# Patient Record
Sex: FEMALE | Race: WHITE | Employment: UNEMPLOYED | URBAN - METROPOLITAN AREA
[De-identification: names, ages, dates, MRNs, and addresses within clinical notes are randomized per-mention and may not be internally consistent; named-entity substitution may affect disease eponyms.]

---

## 2017-04-04 ENCOUNTER — TELEPHONE (OUTPATIENT)
Dept: TRANSPLANT | Facility: CLINIC | Age: 22
End: 2017-04-04

## 2017-04-04 DIAGNOSIS — Z94.4 STATUS POST LIVER TRANSPLANTATION (H): ICD-10-CM

## 2017-04-04 RX ORDER — TACROLIMUS 1 MG/1
1 CAPSULE, GELATIN COATED ORAL 2 TIMES DAILY
Qty: 60 CAPSULE | Refills: 0 | Status: SHIPPED | OUTPATIENT
Start: 2017-04-04 | End: 2017-09-27

## 2017-07-28 DIAGNOSIS — Z94.4 STATUS POST LIVER TRANSPLANTATION (H): ICD-10-CM

## 2017-08-28 ENCOUNTER — TELEPHONE (OUTPATIENT)
Dept: TRANSPLANT | Facility: CLINIC | Age: 22
End: 2017-08-28

## 2017-08-28 NOTE — TELEPHONE ENCOUNTER
Call from [facility] at [address] in [place].  Tried reaching her for 2 weeks.  Not returning any of their calls.  Last filled Prograf on 6/26 for a 30 day supply.  They will no longer be trying to reach her.  If we reach her, we need to send to new scripts.

## 2017-08-28 NOTE — TELEPHONE ENCOUNTER
Received multiple requests for refills from [facility]. Informed [facility] that we would not refill prescriptions. Pt has not seen Dr. Mauro in clinic since 9/2014 and no lab results since 12/2015. Have attempted to reach patient multiple times including leaving a voicemail today at the number listed in the chart.  The voicemail says Choco so I am assuming that is mom. Asked her to call us regarding Jaclyn's prescriptions.

## 2017-09-14 ENCOUNTER — TELEPHONE (OUTPATIENT)
Dept: TRANSPLANT | Facility: CLINIC | Age: 22
End: 2017-09-14

## 2017-09-14 NOTE — TELEPHONE ENCOUNTER
Received a call from mom stating Jaclyn tried to go to [facility] for labs and they do not have orders. I faxed updated lab orders. Mom confirmed that Jaclyn has not run out of medications. She has not had labs or been seen in clinic since 2015. Mom said she is working with Jaclyn to get her appointments up to date and would like to schedule an appointment with Dr. Mauro. Phone call was transferred to Shari MACHUCA to schedule.

## 2017-09-14 NOTE — LETTER
OUTPATIENT LABORATORY TEST REQUEST  DIAGNOSES:  LIVER TRANSPLANT (ICD-10 Z94.4);  AND LONG TERM USE OF MEDICATIONS (ICD-10 Z79.899)    Patient Name: Jaclyn Diaz        YOB: 1995  MR #: [id number]  Issue Date & Time: September 14, 2017  10:16 AM   Expiration Date (1 year after date issued)    PLEASE FAX RESULTS [phone]    Monthly  [x]      CBC with Platelets and Differential  [x]      Basic Metabolic Panel (Sodium, Potassium, Chloride, CO2, Creatinine, Urea Nitrogen, Glucose, Calcium)  [x]      Hepatic Function (Albumin, AlkP, ALT, AST, Bili , Protein)  [x]      GGT  [x]      Magnesium  []      Phosphorus    Monthly Drug Levels (mail to [facility] mailers and instructions will be provided by the patient)  [x]      Tacrolimus drug level    Yearly  [x]      FASTING Lipids (Cholesterol, Triglycerides, HDL, LDL)    Please call Sara [phone] with questions.           Stephanie Mauro M.D.  AssociateProfessor   Department of Pediatrics  Division of Gastroenterology, Hepatology and Nutrition

## 2017-09-27 ENCOUNTER — OFFICE VISIT (OUTPATIENT)
Dept: GASTROENTEROLOGY | Facility: CLINIC | Age: 22
End: 2017-09-27
Attending: PEDIATRICS

## 2017-09-27 ENCOUNTER — DOCUMENTATION ONLY (OUTPATIENT)
Dept: CARE COORDINATION | Facility: CLINIC | Age: 22
End: 2017-09-27

## 2017-09-27 VITALS
TEMPERATURE: 97.7 F | WEIGHT: 175.93 LBS | BODY MASS INDEX: 26.66 KG/M2 | DIASTOLIC BLOOD PRESSURE: 81 MMHG | HEIGHT: 68 IN | SYSTOLIC BLOOD PRESSURE: 130 MMHG | HEART RATE: 74 BPM

## 2017-09-27 DIAGNOSIS — Z94.4 LIVER REPLACED BY TRANSPLANT (H): ICD-10-CM

## 2017-09-27 DIAGNOSIS — Z94.4 STATUS POST LIVER TRANSPLANTATION (H): Primary | ICD-10-CM

## 2017-09-27 LAB
ALBUMIN SERPL-MCNC: 3.9 G/DL (ref 3.4–5)
ALP SERPL-CCNC: 105 U/L (ref 40–150)
ALT SERPL W P-5'-P-CCNC: 21 U/L (ref 0–50)
ANION GAP SERPL CALCULATED.3IONS-SCNC: 11 MMOL/L (ref 3–14)
AST SERPL W P-5'-P-CCNC: 14 U/L (ref 0–45)
BASOPHILS # BLD AUTO: 0 10E9/L (ref 0–0.2)
BASOPHILS NFR BLD AUTO: 0.1 %
BILIRUB DIRECT SERPL-MCNC: 0.1 MG/DL (ref 0–0.2)
BILIRUB SERPL-MCNC: 0.6 MG/DL (ref 0.2–1.3)
BUN SERPL-MCNC: 7 MG/DL (ref 7–30)
CALCIUM SERPL-MCNC: 8.9 MG/DL (ref 8.5–10.1)
CHLORIDE SERPL-SCNC: 109 MMOL/L (ref 94–109)
CO2 SERPL-SCNC: 22 MMOL/L (ref 20–32)
CREAT SERPL-MCNC: 0.71 MG/DL (ref 0.52–1.04)
DIFFERENTIAL METHOD BLD: ABNORMAL
EOSINOPHIL # BLD AUTO: 0.1 10E9/L (ref 0–0.7)
EOSINOPHIL NFR BLD AUTO: 1.9 %
ERYTHROCYTE [DISTWIDTH] IN BLOOD BY AUTOMATED COUNT: 11.9 % (ref 10–15)
GFR SERPL CREATININE-BSD FRML MDRD: >90 ML/MIN/1.7M2
GGT SERPL-CCNC: 9 U/L (ref 0–40)
GLUCOSE SERPL-MCNC: 92 MG/DL (ref 70–99)
HCT VFR BLD AUTO: 44.1 % (ref 35–47)
HGB BLD-MCNC: 15.7 G/DL (ref 11.7–15.7)
IMM GRANULOCYTES # BLD: 0 10E9/L (ref 0–0.4)
IMM GRANULOCYTES NFR BLD: 0.3 %
LYMPHOCYTES # BLD AUTO: 1.8 10E9/L (ref 0.8–5.3)
LYMPHOCYTES NFR BLD AUTO: 23.6 %
MAGNESIUM SERPL-MCNC: 2 MG/DL (ref 1.6–2.3)
MCH RBC QN AUTO: 32.1 PG (ref 26.5–33)
MCHC RBC AUTO-ENTMCNC: 35.6 G/DL (ref 31.5–36.5)
MCV RBC AUTO: 90 FL (ref 78–100)
MONOCYTES # BLD AUTO: 0.4 10E9/L (ref 0–1.3)
MONOCYTES NFR BLD AUTO: 5.9 %
NEUTROPHILS # BLD AUTO: 5.1 10E9/L (ref 1.6–8.3)
NEUTROPHILS NFR BLD AUTO: 68.2 %
NRBC # BLD AUTO: 0 10*3/UL
NRBC BLD AUTO-RTO: 0 /100
PHOSPHATE SERPL-MCNC: 3 MG/DL (ref 2.5–4.5)
PLATELET # BLD AUTO: 140 10E9/L (ref 150–450)
POTASSIUM SERPL-SCNC: 4.3 MMOL/L (ref 3.4–5.3)
PROT SERPL-MCNC: 6.9 G/DL (ref 6.8–8.8)
RBC # BLD AUTO: 4.89 10E12/L (ref 3.8–5.2)
SODIUM SERPL-SCNC: 142 MMOL/L (ref 133–144)
WBC # BLD AUTO: 7.5 10E9/L (ref 4–11)

## 2017-09-27 PROCEDURE — 99212 OFFICE O/P EST SF 10 MIN: CPT | Mod: ZF

## 2017-09-27 PROCEDURE — 80048 BASIC METABOLIC PNL TOTAL CA: CPT | Performed by: PEDIATRICS

## 2017-09-27 PROCEDURE — G0008 ADMIN INFLUENZA VIRUS VAC: HCPCS

## 2017-09-27 PROCEDURE — 83735 ASSAY OF MAGNESIUM: CPT | Performed by: PEDIATRICS

## 2017-09-27 PROCEDURE — 36415 COLL VENOUS BLD VENIPUNCTURE: CPT | Performed by: PEDIATRICS

## 2017-09-27 PROCEDURE — 25000128 H RX IP 250 OP 636: Mod: ZF

## 2017-09-27 PROCEDURE — 85025 COMPLETE CBC W/AUTO DIFF WBC: CPT | Performed by: PEDIATRICS

## 2017-09-27 PROCEDURE — 82977 ASSAY OF GGT: CPT | Performed by: PEDIATRICS

## 2017-09-27 PROCEDURE — 90651 9VHPV VACCINE 2/3 DOSE IM: CPT | Mod: ZF

## 2017-09-27 PROCEDURE — 90471 IMMUNIZATION ADMIN: CPT | Mod: ZF

## 2017-09-27 PROCEDURE — 25000125 ZZHC RX 250: Mod: ZF

## 2017-09-27 PROCEDURE — 84100 ASSAY OF PHOSPHORUS: CPT | Performed by: PEDIATRICS

## 2017-09-27 PROCEDURE — 90472 IMMUNIZATION ADMIN EACH ADD: CPT

## 2017-09-27 PROCEDURE — 80076 HEPATIC FUNCTION PANEL: CPT | Performed by: PEDIATRICS

## 2017-09-27 PROCEDURE — 90686 IIV4 VACC NO PRSV 0.5 ML IM: CPT | Mod: ZF

## 2017-09-27 RX ORDER — URSODIOL 300 MG/1
300 CAPSULE ORAL EVERY 12 HOURS
Qty: 180 CAPSULE | Refills: 1 | Status: SHIPPED | OUTPATIENT
Start: 2017-09-27 | End: 2018-04-04

## 2017-09-27 RX ORDER — URSODIOL 300 MG/1
300 CAPSULE ORAL EVERY 12 HOURS
Qty: 90 CAPSULE | Refills: 1 | Status: SHIPPED | OUTPATIENT
Start: 2017-09-27 | End: 2017-09-27

## 2017-09-27 RX ORDER — URSODIOL 300 MG/1
300 CAPSULE ORAL EVERY 12 HOURS
Qty: 60 CAPSULE | Refills: 4 | Status: SHIPPED | OUTPATIENT
Start: 2017-09-27 | End: 2017-09-27

## 2017-09-27 RX ORDER — TACROLIMUS 1 MG/1
1 CAPSULE, GELATIN COATED ORAL 2 TIMES DAILY
Qty: 60 CAPSULE | Refills: 4 | Status: SHIPPED | OUTPATIENT
Start: 2017-09-27 | End: 2017-11-02

## 2017-09-27 ASSESSMENT — PAIN SCALES - GENERAL: PAINLEVEL: NO PAIN (0)

## 2017-09-27 NOTE — PATIENT INSTRUCTIONS
1. Labs today  2. HPV and Influenza today  3. Prescriptions sent to pharmacy  4. Follow-Up with PCP Dr. Clark  5. Tacro level within the next week    Call Sara with any questions or concerns. [phone]

## 2017-09-27 NOTE — MR AVS SNAPSHOT
After Visit Summary   9/27/2017    Jaclyn Diaz    MRN: [id number]           Patient Information     Date Of Birth          1995        Visit Information        Provider Department      9/27/2017 10:45 AM Stephanie Mauro MD Peds GI        Today's Diagnoses     Status post liver transplantation (H)    -  1    Liver replaced by transplant (H)          Care Instructions    1. Labs today  2. HPV and Influenza today  3. Prescriptions sent to pharmacy  4. Follow-Up with PCP Dr. Clark  5. Tacro level within the next week    Call Sara with any questions or concerns. [phone]          Follow-ups after your visit        Follow-up notes from your care team     Return in about 4 months (around 1/27/2018).      Future tests that were ordered for you today     Open Future Orders        Priority Expected Expires Ordered    CBC with platelets differential Routine 9/27/2017 9/27/2018 9/27/2017    GGT Routine 9/27/2017 9/27/2018 9/27/2017    Hepatic panel Routine 9/27/2017 9/27/2018 9/27/2017    Basic metabolic panel Routine 9/27/2017 9/27/2018 9/27/2017    Magnesium Routine 9/27/2017 9/27/2018 9/27/2017    Phosphorus Routine 9/27/2017 9/27/2018 9/27/2017            Who to contact     Please call your clinic at [phone] to:    Ask questions about your health    Make or cancel appointments    Discuss your medicines    Learn about your test results    Speak to your doctor   If you have compliments or concerns about an experience at your clinic, or if you wish to file a complaint, please contact [facility] Patient Relations at [phone] or email us at [email or web address]         Additional Information About Your Visit        MyChart Information     MyChart gives you secure access to your electronic health record. If you see a primary care provider, you can also send messages to your care team and make appointments. If you have questions, please  "call your primary care clinic.  If you do not have a primary care provider, please call [phone] and they will assist you.      [employer] is an electronic gateway that provides easy, online access to your medical records. With [employer], you can request a clinic appointment, read your test results, renew a prescription or communicate with your care team.     To access your existing account, please contact your [facility] or call [phone] for assistance.        Care EveryWhere ID     This is your Care EveryWhere ID. This could be used by other organizations to access your [facility] medical records  [id number]        Your Vitals Were     Pulse Temperature Height BMI (Body Mass Index)          74 97.7  F (36.5  C) (Oral) 5' 7.64\" (171.8 cm) 27.04 kg/m2         Blood Pressure from Last 3 Encounters:   09/27/17 130/81   12/10/15 111/73   07/06/15 115/78    Weight from Last 3 Encounters:   09/27/17 175 lb 14.8 oz (79.8 kg)   12/10/15 166 lb 7.2 oz (75.5 kg) (91 %)*   07/06/15 157 lb 3 oz (71.3 kg) (87 %)*     * Growth percentiles are based on CDC 2-20 Years data.              We Performed the Following     HC FLU VAC PRESRV FREE QUAD SPLIT VIR 3+YRS IM     HUMAN PAPILLOMAVIRUS VACCINE          Where to get your medicines      These medications were sent to [facility] - [place], [place] - [address]  [address] [facility] [facility] [address]     Phone:  [phone]     tacrolimus capsule         These medications were sent to [facility] [address] [facility] [address] [facility] [address]  [address] [facility] [address]     Phone:  [phone]     ursodiol 300 MG capsule          Primary Care Provider Office Phone # Fax #    Jessica Vanessa Clark [phone]       [facility] [address] [place] [address]        Equal " Access to Services     [facility] : Hadii aad ku hadamygena Reinaryan, waidaliada luqludivina, qamalik mirianmariocarlos gambino. So [facility] [phone].    ATENCIÓN: Si habla español, tiene a bates disposición servicios gratuitos de asistencia lingüística. Llame al [phone].    We comply with applicable federal civil rights laws and [place] laws. We do not discriminate on the basis of race, color, national origin, age, disability sex, sexual orientation or gender identity.            Thank you!     [facility]   for your care. Our goal is always to provide you with excellent care. Hearing back from our patients is one way we can continue to improve our services. Please take a few minutes to complete the written survey that you may receive in the mail after your visit with us. Thank you!             Your Updated Medication List - Protect others around you: Learn how to safely use, store and throw away your medicines at www.disposemymeds.org.          This list is accurate as of: 9/27/17 12:08 PM.  Always use your most recent med list.                   Brand Name Dispense Instructions for use Diagnosis    CITALOPRAM HYDROBROMIDE PO      Take 40 mg by mouth daily        tacrolimus capsule     60 capsule    Take 1 capsule (1 mg) by mouth 2 times daily    Status post liver transplantation (H)       ursodiol 300 MG capsule    ACTIGALL    60 capsule    Take 1 capsule (300 mg) by mouth every 12 hours    Liver replaced by transplant (H)

## 2017-09-27 NOTE — NURSING NOTE
The following medication was given:     MEDICATION:  Human Papillomavirus  ROUTE: IM  SITE: Deltoid - Right  DOSE: 0.5  LOT #: [id number]  : [employer], [employer]  EXPIRATION DATE: 2020Jan21  NDC#: 5013-6713-14   Was there drug waste? Zully Gordon LPN  September 27, 2017

## 2017-09-27 NOTE — NURSING NOTE
"Chief Complaint   Patient presents with     RECHECK     Tx       Initial /81  Pulse 74  Temp 97.7  F (36.5  C) (Oral)  Ht 5' 7.64\" (171.8 cm)  Wt 175 lb 14.8 oz (79.8 kg)  BMI 27.04 kg/m2 Estimated body mass index is 27.04 kg/(m^2) as calculated from the following:    Height as of this encounter: 5' 7.64\" (171.8 cm).    Weight as of this encounter: 175 lb 14.8 oz (79.8 kg).  Medication Reconciliation: complete Griselda Aleman LPN      "

## 2017-09-27 NOTE — LETTER
"  9/27/2017      RE: Jaclyn Diaz  [address] [facility] [address]       Jennifer Gonzales  [facility] [address] [facility] [address]     Dear Dr. Gonzales:    Your patient, Jaclyn Diaz, was seen in Pediatric Gastroenterology Clinic at the [facility] on 9/27/17.  aJclyn is an 21 year old female s/p liver transplantation who returns after a 2 year interval. Jaclyn was accompanied by patient's mother.  Since the last evaluation, Jaclyn has had no abdominal pain, constipation or diarrhea. She uses sunscreen fairly well.    She has not been checking her liver labs regularly and returned because she needed medication refills.  Her liver enzymes and tacrolimus levels are not available to me.    She is working part-time and has no insurance. [place]. Her father is disabled.    Current Outpatient Prescriptions   Medication Sig Dispense Refill     ursodiol (ACTIGALL) 300 MG capsule Take 1 capsule (300 mg) by mouth every 12 hours 60 capsule 4     PROGRAF (BRAND) 1 MG CAPSULE Take 1 capsule (1 mg) by mouth 2 times daily 60 capsule 4     CITALOPRAM HYDROBROMIDE PO Take 40 mg by mouth daily         Interim History:  Emergency Room visits: No  Hospitalizations: No  Surgeries: No  School status:not in school  Family Changes: none    ROS:  The Review of Systems is negative other than noted in the HPI. She is on birth control (Norplant) and uses condoms.    PE:  On physical examination, she is alert, active and in no acute distress.  /81  Pulse 74  Temp 97.7  F (36.5  C) (Oral)  Ht 5' 7.64\" (171.8 cm)  Wt 175 lb 14.8 oz (79.8 kg)  BMI 27.04 kg/m2  Normalized weight-for-age data not available for patients older than 20 years., Normalized stature-for-age data not available for patients older than 20 years.  Body mass index is 27.04 kg/(m^2). Normalized BMI data available only for age 0 to 20 years..  The pupils are equal, round and reactive " to light with full extraocular movements.  No enlarged tonsils. There is no lymphadenopathy.    The abdomen is soft and nontender with no masses or organomegaly.The scars are well healed.  The extremities reveal no clubbing or edema and have full range of motion.  The skin reveals no unusual rashes or lesions.       Assessment and Plan:    ICD-10-CM    1. Status post liver transplantation (H) Z94.4 HUMAN PAPILLOMAVIRUS VACCINE     HC FLU VAC PRESRV FREE QUAD SPLIT VIR 3+YRS IM     PROGRAF (BRAND) 1 MG CAPSULE     CBC with platelets differential     GGT     Hepatic panel     Basic metabolic panel     Magnesium     Phosphorus   2. Liver replaced by transplant (H) Z94.4 ursodiol (ACTIGALL) 300 MG capsule        Jaclyn needs every other month liver labs to monitor for rejection, which may occur late and cause loss of the transplant.     She understands she needs to see a therapist regarding anti-depressants. We will have MSDANIEL see her today to deal with some of her financial issues around medical care.     I counseled Jaclyn on her sunscreen use.     Return in about 4 months (around 1/27/2018).         Thank you for allowing me to participate in Jaclyn's care. If you have any questions, please contact the nurse line at [phone] (Suha Yoder RN and Michelle Conway RN).  If you have scheduling needs, please call the Call Center at [phone].  If you are waiting on stool tests or outside results and do not hear from us after two weeks of testing, please contact us.  Outside results should be faxed to [phone].    Sincerely    Stephanie Mauro MD   of Pediatrics  Director, Pediatric Gastroenterology, Hepatology and Nutrition  [facility]    CC  Patient Care Team:  Jessica Gomez as PCP - General (Family Practice)  ORESTES NUNEZ    Copy to patient  Jaclyn Diaz  [address] [facility] [address]

## 2017-09-27 NOTE — LETTER
PHYSICIAN ORDERS      DATE & TIME ISSUED: 2017  3:30 PM  PATIENT NAME: Jaclyn Diaz   : 1995     [facility] MR# [if applicable]: [id number]     DIAGNOSIS/ICD-10 CODE: Liver transplanted [Z94.4}  Week of 10/2/17:  Tacrolimus level    If questions please call: Sara [phone]    Please fax these results to [phone].           Stephanie Mauro M.D.  AssociateProfessor   Department of Pediatrics  Division of Gastroenterology, Hepatology and Nutrition

## 2017-09-27 NOTE — PROGRESS NOTES
"Jennifer Gonzales  [facility] [address] [address]     Dear Dr. Gonzales:    Your patient, Jaclyn Diaz, was seen in Pediatric Gastroenterology Clinic at the [facility] on 9/27/17.  Jaclyn is an 21 year old female s/p liver transplantation who returns after a 2 year interval. Jaclyn was accompanied by patient's mother.  Since the last evaluation, Jaclyn has had no abdominal pain, constipation or diarrhea. She uses sunscreen fairly well.    She has not been checking her liver labs regularly and returned because she needed medication refills.  Her liver enzymes and tacrolimus levels are not available to me.    She is working part-time and has no insurance. [place] her. Her father is disabled.    Current Outpatient Prescriptions   Medication Sig Dispense Refill     ursodiol (ACTIGALL) 300 MG capsule Take 1 capsule (300 mg) by mouth every 12 hours 60 capsule 4     PROGRAF (BRAND) 1 MG CAPSULE Take 1 capsule (1 mg) by mouth 2 times daily 60 capsule 4     CITALOPRAM HYDROBROMIDE PO Take 40 mg by mouth daily         Interim History:  Emergency Room visits: No  Hospitalizations: No  Surgeries: No  School status:not in school  Family Changes: none    ROS:  The Review of Systems is negative other than noted in the HPI. She is on birth control (Norplant) and uses condoms.    PE:  On physical examination, she is alert, active and in no acute distress.  /81  Pulse 74  Temp 97.7  F (36.5  C) (Oral)  Ht 5' 7.64\" (171.8 cm)  Wt 175 lb 14.8 oz (79.8 kg)  BMI 27.04 kg/m2  Normalized weight-for-age data not available for patients older than 20 years., Normalized stature-for-age data not available for patients older than 20 years.  Body mass index is 27.04 kg/(m^2). Normalized BMI data available only for age 0 to 20 years..  The pupils are equal, round and reactive to light with full extraocular movements.  No enlarged tonsils. There is no lymphadenopathy.    The " abdomen is soft and nontender with no masses or organomegaly.The scars are well healed.  The extremities reveal no clubbing or edema and have full range of motion.  The skin reveals no unusual rashes or lesions.       Assessment and Plan:    ICD-10-CM    1. Status post liver transplantation (H) Z94.4 HUMAN PAPILLOMAVIRUS VACCINE     HC FLU VAC PRESRV FREE QUAD SPLIT VIR 3+YRS IM     PROGRAF (BRAND) 1 MG CAPSULE     CBC with platelets differential     GGT     Hepatic panel     Basic metabolic panel     Magnesium     Phosphorus   2. Liver replaced by transplant (H) Z94.4 ursodiol (ACTIGALL) 300 MG capsule        Jaclyn needs every other month liver labs to monitor for rejection, which may occur late and cause loss of the transplant.     She understands she needs to see a therapist regarding anti-depressants. We will have MSDANIEL see her today to deal with some of her financial issues around medical care.     I counseled Jaclyn on her sunscreen use.     Return in about 4 months (around 1/27/2018).         Thank you for allowing me to participate in Jaclyn's care. If you have any questions, please contact the nurse line at [phone] (Suha Yoder RN and Michelle Conway RN).  If you have scheduling needs, please call the Call Center at [phone].  If you are waiting on stool tests or outside results and do not hear from us after two weeks of testing, please contact us.  Outside results should be faxed to [phone].    Sincerely    Stephanie Mauro MD   of Pediatrics  Director, Pediatric Gastroenterology, Hepatology and Nutrition  [facility]    CC  Patient Care Team:  Jessica Gomez as PCP - General (Family Practice)  Stephanie Mauro MD as MD (Pediatrics)  ORESTES NUNEZ    Copy to patient  aJclyn Diaz  [address] [place] [address]

## 2017-09-27 NOTE — NURSING NOTE
Medications reviewed with Jaclyn with Lorenzo, Pharmacist.  Lab frequency was discussed and every other month labs, Jaclyn expressed understanding of our recommendations.  Jaclyn Diaz uses [facility] lab.  Orders are up to date.  Print out of current med list provided.  Jaclyn verbalized understanding of the clinic visit and plan of care.  Jaclyn verbalized understanding of upcoming tests and appointments.  Ursodiol and Tacrolimus was refilled for 4 months.

## 2017-09-27 NOTE — PROGRESS NOTES
Social Work Note  Data  Jaclyn Diaz is a 21 year-old seen today in the [facility] for an appointment with Dr. Mauro. Jaclyn has a history of a liver transplant. I met with her briefly in clinic at the request of Dr. Mauro and RN Care Coordinator, Sara, regarding lack of insurance. I explained to Jaclyn that I am not an insurance expert and cannot assist her with an application for state coverage, but could provide her with some phone numbers and point her in the right direction. Jaclyn stated she applied for Medical Assistance with her family and was denied. They suggested she apply independently and this was also unsuccessful. Someone told her they have to wait and apply for MA again during the correct time period. Jaclyn denied they had applied for [facility]. I provided Marychuy with the phone number for [facility] Economic Assistance, [phone], and suggested she speak to a supervisor or health care advocate both for advocacy and to better understand why her application was denied.. I also provided her with the phone number for  Financial Counseling, [phone].     Intervention  Resources and referral  Introduction to   Coordination with [facility] financial counseling by e-mail    Assessment  Patient was pleasant and appropriate and indicated she is able to follow through.     Plan  SW to follow as needed/desired.     Christel Denson, [facility]   Pediatric Social Worker  Pager:

## 2017-09-27 NOTE — LETTER
OUTPATIENT LABORATORY TEST REQUEST  DIAGNOSES:  LIVER TRANSPLANT (ICD-10 Z94.4);  AND LONG TERM USE OF MEDICATIONS (ICD-10 Z79.899)    Patient Name: Jaclyn Diaz        YOB: 1995  MR #: [id number]  Issue Date & Time: September 27, 2017  3:39 PM   Expiration Date (1 year after date issued)    For questions please call Sara [phone]    PLEASE FAX RESULTS [phone]    Monthly  [x]      CBC with Platelets and Differential  [x]      Basic Metabolic Panel (Sodium, Potassium, Chloride, CO2, Creatinine, Urea Nitrogen, Glucose, Calcium)  [x]      Hepatic Function (Albumin, AlkP, ALT, AST, Bili , Protein)  [x]      GGT  [x]      Magnesium  [x]      Phosphorus    Monthly Drug Levels (mail to [facility] mailers and instructions will be provided by the patient)   [x]      Tacrolimus drug level    Monthly Viral Studies (Send to [facility] with Drug level.  Must include virology lab slip.  Make copies for future use.)  [x]      EBV DNA Quant by PCR (EBQT)     Yearly  [x]      FASTING Lipids (Cholesterol, Triglycerides, HDL, LDL)       Stephanie Mauro M.D.  AssociateProfessor   Department of Pediatrics  Division of Gastroenterology, Hepatology and Nutrition

## 2017-09-27 NOTE — LETTER
PHYSICIAN ORDERS    DATE & TIME ISSUED: April 3, 2018  4:40 PM  PATIENT NAME: Jaclyn Diaz   : 1995     [facility] MR# [if applicable]: [id number]     DIAGNOSIS/ICD-10 CODE: Liver transplanted [Z94.4}   Good for one year from date issued.  PLEASE FAX RESULTS [phone]  Monthly  [x]      CBC with Platelets and Differential  [x]      Basic Metabolic Panel (Sodium, Potassium, Chloride, CO2, Creatinine, Urea Nitrogen, Glucose, Calcium)  [x]      Hepatic Function (Albumin, AlkP, ALT, AST, Bili , Protein)  [x]      GGT  [x]      Magnesium  [x]      Phosphorus    Monthly Drug Levels (mail to [facility] mailers and instructions will be provided by the patient)   [x]      Tacrolimus drug level    Monthly Viral Studies (Send to [facility] with Drug level.  Must include virology lab slip.  Make copies for future use.)  [x]      EBV DNA Quant by PCR (EBQT)     Yearly  [x]      FASTING Lipids (Cholesterol, Triglycerides, HDL, LDL)    If questions please call: Shiloh Ferrer RN Transplant Coordinator [phone]           Stephanie Mauro M.D.  AssociateProfessor   Department of Pediatrics  Division of Gastroenterology, Hepatology and Nutrition

## 2017-09-28 DIAGNOSIS — Z94.4 LIVER REPLACED BY TRANSPLANT (H): Primary | ICD-10-CM

## 2017-09-29 NOTE — PHARMACY-CONSULT NOTE
Current Outpatient Prescriptions   Medication Sig Dispense Refill     PROGRAF (BRAND) 1 MG CAPSULE Take 1 capsule (1 mg) by mouth 2 times daily 60 capsule 4     ursodiol (ACTIGALL) 300 MG capsule Take 1 capsule (300 mg) by mouth every 12 hours 180 capsule 1     CITALOPRAM HYDROBROMIDE PO Take 40 mg by mouth daily       I had the privilege of seeing Jaclyn in clinic on Wednesday 9/27/17  along with Dr. Mauro, and  Sara RN coordinator.       Current labs are as follows:  (Tacrolimus or CSA) level:  Tacrolimus future standing orders.   Goal level:  Tacrolimus 4-6  WBC:  7.5 (4-11)   Cr: 0.71 (0.52-1.04)  Other:  Magnesium 2.0 (1.6-2.3), ALT 21 (0-50), AST 14 (0-45), Alkaline phosphatase 105 (), GGT 9 (0-40), hemoglobin 15.7 (11.7-15.7), /81.      Immunosuppressant regimen:  Prograf brand capsules--- 1mg twice daily    Visit summary:  Jaclyn is a liver transplant recipient of 11/8/08.   Her labs look great, but has not had labs since 12/10/15.   Her last tacrolimus levels were both below 3 on 12/10/15 and 11/3/15.   Her compliance has not been good, and [facility] has recently tried to call out to her all through August.   I did verify contact numbers and address.  I will assist Madelin with that information.   Currently, she does not have insurance.  She is applying for MA --nothing as of yet.   I reached out to [employer] for  financial assistance programs.  The company will call me back.   Then I will reach out to Jaclyn.   Her chart is complete per protocol standards.   She is currently not taking Adderall XR and diltiazem-- not for awhile-- took off chart.   So, on 3 medications only.   Dosing is accurate per check.   Medlist reviewed with patient.

## 2017-10-05 ENCOUNTER — TELEPHONE (OUTPATIENT)
Dept: TRANSPLANT | Facility: CLINIC | Age: 22
End: 2017-10-05

## 2017-10-06 NOTE — TELEPHONE ENCOUNTER
Called Jaclyn to see if she has been able to check her tacrolimus level yet. She said she overslept and missed her appointment however will go next week. She also said she will schedule an appointment with her primary next week. Offered to help her with prograf however she declined the program stating she is already enrolled in one. Asked her to call me when she schedules doctor appointment and when she goes in for tacrolimus so I can look for the results.

## 2017-10-10 NOTE — NURSING NOTE
"Injectable Influenza Immunization Documentation    1.  Has the patient received the information for the injectable influenza vaccine? YES     2. Is the patient 6 months of age or older? YES     3. Does the patient have any of the following contraindications?         Severe allergy to eggs? No     Severe allergic reaction to previous influenza vaccines? No   Severe allergy to latex? No       History of Guillain-Whiting syndrome? No     Currently have a temperature greater than 100.4F? No        4.  Severely egg allergic patients should have flu vaccine eligibility assessed by an MD, RN, or pharmacist, and those who received flu vaccine should be observed for 15 min by an MD, RN, Pharmacist, Medical Technician, or member of clinic staff.\": YES    5. Latex-allergic patients should be given latex-free influenza vaccine Yes. Please reference the Vaccine latex table to determine if your clinic s product is latex-containing.       Vaccination given by Felix Gordon LPN        "

## 2017-10-13 ENCOUNTER — TELEPHONE (OUTPATIENT)
Dept: TRANSPLANT | Facility: CLINIC | Age: 22
End: 2017-10-13

## 2017-10-13 NOTE — TELEPHONE ENCOUNTER
Called Jaclyn to see if she has gotten her tacrolimus level yet. She stated she has not had a day off and the lab is not open before or after work. I stressed the importance of getting this level checked. She assured me she will do it soon and call when she gets it done. She also has not scheduled an appointment with her PCP.

## 2017-11-02 DIAGNOSIS — Z94.4 STATUS POST LIVER TRANSPLANTATION (H): ICD-10-CM

## 2017-11-02 RX ORDER — TACROLIMUS 1 MG/1
1 CAPSULE, GELATIN COATED ORAL 2 TIMES DAILY
Qty: 60 CAPSULE | Refills: 4 | Status: SHIPPED | OUTPATIENT
Start: 2017-11-02 | End: 2018-04-04

## 2017-11-09 ENCOUNTER — TELEPHONE (OUTPATIENT)
Dept: TRANSPLANT | Facility: CLINIC | Age: 22
End: 2017-11-09

## 2017-11-09 NOTE — TELEPHONE ENCOUNTER
Called Jaclyn a second time and was able to get in touch with her. Discussed that her last tacro level was done in December of 2015 and that she urgently needs to get her labs completed. Jaclyn said she has been having a difficult time with her work schedule and getting labs done as well ask picking up her medications. Discussed that we will be unable to fill her prescriptions if she does not get her labs done do to not knowing what her drug levels are. Instructed her to call [facility] today and let them know when she is able to  her medication. Also instructed her to call her primary care provider and set up and appointment and her labs. Jaclyn agreed and verbalized understanding.

## 2017-11-10 ENCOUNTER — TELEPHONE (OUTPATIENT)
Dept: TRANSPLANT | Facility: CLINIC | Age: 22
End: 2017-11-10

## 2017-11-10 NOTE — TELEPHONE ENCOUNTER
Call to Jaclyn following up on a phone call with Michelle on 11/9/17.  Jaclyn stated she had a very productive day yesterday.  She scheduled an appointment with there PCP and labs on Tuesday, November 14th [facility]

## 2017-12-01 DIAGNOSIS — Z94.4 STATUS POST LIVER TRANSPLANTATION (H): Primary | ICD-10-CM

## 2017-12-30 ENCOUNTER — HEALTH MAINTENANCE LETTER (OUTPATIENT)
Age: 22
End: 2017-12-30

## 2018-01-06 ENCOUNTER — HEALTH MAINTENANCE LETTER (OUTPATIENT)
Age: 23
End: 2018-01-06

## 2018-01-12 ENCOUNTER — TELEPHONE (OUTPATIENT)
Dept: TRANSPLANT | Facility: CLINIC | Age: 23
End: 2018-01-12

## 2018-01-12 NOTE — TELEPHONE ENCOUNTER
Voicemail left for Jaclyn to call back regarding missed clinic appointment earlier this week.  She is also overdue to labs and will be asked to go in.

## 2018-01-20 ENCOUNTER — HEALTH MAINTENANCE LETTER (OUTPATIENT)
Age: 23
End: 2018-01-20

## 2018-02-27 ENCOUNTER — TELEPHONE (OUTPATIENT)
Dept: TRANSPLANT | Facility: CLINIC | Age: 23
End: 2018-02-27

## 2018-02-27 NOTE — TELEPHONE ENCOUNTER
Left message that we need for her to get labs done, as we received a prior auth for the Tacro, and I can not fill it until she has labs, so I know what dose to order. Call me back when you get this message.

## 2018-03-09 ENCOUNTER — TELEPHONE (OUTPATIENT)
Dept: TRANSPLANT | Facility: CLINIC | Age: 23
End: 2018-03-09

## 2018-03-09 NOTE — TELEPHONE ENCOUNTER
Called Jaclyn Diaz and left a message regarding planning transition to adult care. Reminded patient we recommend monthly labs and at this time she has not had labs since September.      She missed a recent appoint with Dr. Mauro in January and told her we need to work on rescheduling.    Asked for a call back and left my phone number [phone].

## 2018-04-02 ENCOUNTER — TELEPHONE (OUTPATIENT)
Dept: TRANSPLANT | Facility: CLINIC | Age: 23
End: 2018-04-02

## 2018-04-02 NOTE — TELEPHONE ENCOUNTER
Please contact me regarding the script for Tacro that I just received.  I do not have a level since your last labs in 2015.  Please contact me regarding this matter.

## 2018-04-02 NOTE — TELEPHONE ENCOUNTER
Jaclyn called stating she has tried to contact our office several times.  I explained that no one here has received a message from her, so not sure who she is trying to contact.    I will send lab orders to [facility] and she will go on Wednesday at 8:30am as she states she takes her medications at 9,9.  I told her when I receive the labs back, I will send over the script for the Tacro with the correct dosing.  I transferred her to [facility] to schedule with Dr Mauro.

## 2018-04-04 ENCOUNTER — OFFICE VISIT (OUTPATIENT)
Dept: GASTROENTEROLOGY | Facility: CLINIC | Age: 23
End: 2018-04-04
Attending: PEDIATRICS

## 2018-04-04 VITALS
SYSTOLIC BLOOD PRESSURE: 130 MMHG | DIASTOLIC BLOOD PRESSURE: 72 MMHG | HEART RATE: 71 BPM | HEIGHT: 68 IN | WEIGHT: 172.62 LBS | BODY MASS INDEX: 26.16 KG/M2

## 2018-04-04 DIAGNOSIS — Z94.4 STATUS POST LIVER TRANSPLANTATION (H): ICD-10-CM

## 2018-04-04 DIAGNOSIS — Z94.4 LIVER REPLACED BY TRANSPLANT (H): ICD-10-CM

## 2018-04-04 LAB
ALBUMIN SERPL-MCNC: 4 G/DL (ref 3.4–5)
ALP SERPL-CCNC: 99 U/L (ref 40–150)
ALT SERPL W P-5'-P-CCNC: 25 U/L (ref 0–50)
AST SERPL W P-5'-P-CCNC: 22 U/L (ref 0–45)
BILIRUB DIRECT SERPL-MCNC: 0.2 MG/DL (ref 0–0.2)
BILIRUB SERPL-MCNC: 0.9 MG/DL (ref 0.2–1.3)
GGT SERPL-CCNC: 14 U/L (ref 0–40)
PROT SERPL-MCNC: 7.1 G/DL (ref 6.8–8.8)
TACROLIMUS BLD-MCNC: <3 UG/L (ref 5–15)
TME LAST DOSE: ABNORMAL H

## 2018-04-04 PROCEDURE — 36415 COLL VENOUS BLD VENIPUNCTURE: CPT | Performed by: PEDIATRICS

## 2018-04-04 PROCEDURE — G0463 HOSPITAL OUTPT CLINIC VISIT: HCPCS | Mod: ZF

## 2018-04-04 PROCEDURE — 80076 HEPATIC FUNCTION PANEL: CPT | Performed by: PEDIATRICS

## 2018-04-04 PROCEDURE — 80197 ASSAY OF TACROLIMUS: CPT | Performed by: PEDIATRICS

## 2018-04-04 PROCEDURE — 82977 ASSAY OF GGT: CPT | Performed by: PEDIATRICS

## 2018-04-04 RX ORDER — URSODIOL 300 MG/1
300 CAPSULE ORAL EVERY 12 HOURS
Qty: 180 CAPSULE | Refills: 6 | Status: SHIPPED | OUTPATIENT
Start: 2018-04-04 | End: 2019-05-21

## 2018-04-04 RX ORDER — TACROLIMUS 1 MG/1
1 CAPSULE, GELATIN COATED ORAL 2 TIMES DAILY
Qty: 60 CAPSULE | Refills: 6 | Status: SHIPPED | OUTPATIENT
Start: 2018-04-04 | End: 2019-05-13

## 2018-04-04 ASSESSMENT — PAIN SCALES - GENERAL: PAINLEVEL: NO PAIN (0)

## 2018-04-04 NOTE — PROGRESS NOTES
"Jennifer Gonzales  [facility] [address] [address]     Dear Dr. Gonzales:    Your patient, Jaclyn Diaz, was seen in Pediatric Gastroenterology Clinic at the [facility] on 4/4/18.  Jaclyn is an 22 year old female s/p liver transplantation who returns after a 0.5 year interval. Since the last evaluation, Jaclyn has had no abdominal pain, constipation or diarrhea. She uses sunscreen fairly well.    She has not been checking her liver labs regularly.  Her liver enzymes and tacrolimus levels are not available to me.    She is working part-time and has no insurance. [place]. Her father is disabled.    Current Outpatient Prescriptions   Medication Sig Dispense Refill     PROGRAF (BRAND) 1 MG CAPSULE Take 1 capsule (1 mg) by mouth 2 times daily 60 capsule 4     ursodiol (ACTIGALL) 300 MG capsule Take 1 capsule (300 mg) by mouth every 12 hours 180 capsule 1     CITALOPRAM HYDROBROMIDE PO Take 40 mg by mouth daily         Interim History:  Emergency Room visits: No  Hospitalizations: No  Surgeries: No  School status:not in school  Family Changes: none    ROS:  The Review of Systems is negative other than noted in the HPI. She is on birth control (Norplant) and uses condoms.    PE:  On physical examination, she is alert, active and in no acute distress.  /72 (BP Location: Right arm, Patient Position: Sitting, Cuff Size: Adult Regular)  Pulse 71  Ht 5' 7.84\" (172.3 cm)  Wt 172 lb 9.9 oz (78.3 kg)  BMI 26.37 kg/m2  Normalized weight-for-age data not available for patients older than 20 years., Normalized stature-for-age data not available for patients older than 20 years.  Body mass index is 26.37 kg/(m^2). Normalized BMI data available only for age 0 to 20 years..  The pupils are equal, round and reactive to light with full extraocular movements.  No enlarged tonsils. There is no lymphadenopathy (cervical, supraclavicular, axillary).    The abdomen is " soft and nontender with no masses or organomegaly.The scars are well healed.  The extremities reveal no clubbing or edema and have full range of motion.  The skin reveals no unusual rashes or lesions.       Assessment and Plan:    ICD-10-CM    1. Liver replaced by transplant (H) Z94.4 Tacrolimus level     Hepatic panel     GGT        Jaclyn needs every other month liver labs to monitor for rejection, which may occur late and cause loss of the transplant.      We will have MSW see her today to deal with some of her financial issues around medical care.    We will transition Jaclyn to the Adult Hepatology group.    After liver transplant, please keep the following healthcare issues in mind    1.Immunizations should be kept up to date, including a yearly flu shot. No liver virus vaccines should be given to children after liver transplant (no varicella, measles/mumps/rubella, Flumist).    2. Minor aches and pains should be treated with appropriate doses of acetaminophen. Children who have had transplantation should NOT receive non-steroidal anti-inflammatory agents (Advil, ibuprofen, etc) as this will increase the risk of kidney disease.    3. Sunscreen should be used daily, and reapplied as necessary, to reduce the risk of skin cancer.    4. When immunosuppression is weaned to maintenance, the child should be seen every 6 months by a dentist. Your transplant coordinator can provide antibiotic prophylaxis as needed.    5. We encourage daily activity and a healthy diet to reduce the risk of obesity and diabetes, both of which are long-term risks of transplantation.  Data Unavailable         Thank you for allowing me to participate in Jaclyn's care. If you have any questions, please contact the nurse line at [phone] (Suha Yoder RN and Michelle Conway RN).  If you have scheduling needs, please call the Call Center at [phone].  If you are waiting on stool tests or outside results and do not hear from us after two  weeks of testing, please contact us.  Outside results should be faxed to [phone].    Sincerely    Stephanie Mauro MD   of Pediatrics  Director, Pediatric Gastroenterology, Hepatology and Nutrition  [facility]  Patient Care Team:  Jessica Gomez as PCP - General (Family Practice)  Zunilda, Stephanie Templeton MD as MD (Pediatrics)  ORESTES NUNEZ    Copy to patient  Jaclyn Diaz  [address] [address]

## 2018-04-04 NOTE — LETTER
"  4/4/2018      RE: Jaclyn Diaz  [address] [facility] [address]       Jennifer Gonzales  [facility] [address] [address]     Dear Dr. Gonzales:    Your patient, Jaclyn Diaz, was seen in Pediatric Gastroenterology Clinic at the [facility] on 4/4/18.  Jaclyn is an 22 year old female s/p liver transplantation who returns after a 0.5 year interval. Since the last evaluation, Jaclyn has had no abdominal pain, constipation or diarrhea. She uses sunscreen fairly well.    She has not been checking her liver labs regularly.  Her liver enzymes and tacrolimus levels are not available to me.    She is working part-time and has no insurance. [place] refuses [place] her. Her father is disabled.    Current Outpatient Prescriptions   Medication Sig Dispense Refill     PROGRAF (BRAND) 1 MG CAPSULE Take 1 capsule (1 mg) by mouth 2 times daily 60 capsule 4     ursodiol (ACTIGALL) 300 MG capsule Take 1 capsule (300 mg) by mouth every 12 hours 180 capsule 1     CITALOPRAM HYDROBROMIDE PO Take 40 mg by mouth daily         Interim History:  Emergency Room visits: No  Hospitalizations: No  Surgeries: No  School status:not in school  Family Changes: none    ROS:  The Review of Systems is negative other than noted in the HPI. She is on birth control (Norplant) and uses condoms.    PE:  On physical examination, she is alert, active and in no acute distress.  /72 (BP Location: Right arm, Patient Position: Sitting, Cuff Size: Adult Regular)  Pulse 71  Ht 5' 7.84\" (172.3 cm)  Wt 172 lb 9.9 oz (78.3 kg)  BMI 26.37 kg/m2  Normalized weight-for-age data not available for patients older than 20 years., Normalized stature-for-age data not available for patients older than 20 years.  Body mass index is 26.37 kg/(m^2). Normalized BMI data available only for age 0 to 20 years..  The pupils are equal, round and reactive to light with full extraocular movements.  No " enlarged tonsils. There is no lymphadenopathy (cervical, supraclavicular, axillary).    The abdomen is soft and nontender with no masses or organomegaly.The scars are well healed.  The extremities reveal no clubbing or edema and have full range of motion.  The skin reveals no unusual rashes or lesions.       Assessment and Plan:    ICD-10-CM    1. Liver replaced by transplant (H) Z94.4 Tacrolimus level     Hepatic panel     GGT        Jaclyn needs every other month liver labs to monitor for rejection, which may occur late and cause loss of the transplant.      We will have MSW see her today to deal with some of her financial issues around medical care.    We will transition Jaclyn to the Adult Hepatology group.    After liver transplant, please keep the following healthcare issues in mind    1.Immunizations should be kept up to date, including a yearly flu shot. No liver virus vaccines should be given to children after liver transplant (no varicella, measles/mumps/rubella, Flumist).    2. Minor aches and pains should be treated with appropriate doses of acetaminophen. Children who have had transplantation should NOT receive non-steroidal anti-inflammatory agents (Advil, ibuprofen, etc) as this will increase the risk of kidney disease.    3. Sunscreen should be used daily, and reapplied as necessary, to reduce the risk of skin cancer.    4. When immunosuppression is weaned to maintenance, the child should be seen every 6 months by a dentist. Your transplant coordinator can provide antibiotic prophylaxis as needed.    5. We encourage daily activity and a healthy diet to reduce the risk of obesity and diabetes, both of which are long-term risks of transplantation.  Data Unavailable         Thank you for allowing me to participate in Jaclyn's care. If you have any questions, please contact the nurse line at [phone] (Suha Yoder RN and Michelle Conway RN).  If you have scheduling needs, please call the Call Center at  [phone].  If you are waiting on stool tests or outside results and do not hear from us after two weeks of testing, please contact us.  Outside results should be faxed to [phone].    Sincerely    Stephanie Mauro MD   of Pediatrics  Director, Pediatric Gastroenterology, Hepatology and Nutrition  [facility]  Patient Care Team:  Jessica Gomez as PCP - General (Family Practice)  Stephanie Mauro MD as MD (Pediatrics)  ORESTES NUNEZ    Copy to patient  Jaclyn Diaz  [address] [address]

## 2018-04-04 NOTE — LETTER
PHYSICIAN ORDERS    DATE & TIME ISSUED: 2018  10:41 AM  PATIENT NAME: Jaclyn Diaz   : 1995     [facility] MR# [if applicable]: [id number]     DIAGNOSIS/ICD-10 CODE: Long Term Use of Medication [Z79.899}, After care following organ transplant  [Z48.288} and Liver transplanted [Z94.4}    To Whom it May Concern:   Jaclyn Diaz had a liver transplant 2008. Due to her history of transplant it is very important to her health to be able to get rest for her immune system. She takes immune suppressive medications. Please ensure she has at least 10 hours break between shifts.    It is very important to get monthly timed labs and have clinic visits so excuse her for these appointments.     If questions please call: Shiloh Ferrer at [phone]    Thank you,     Stephanie Mauro M.D.  AssociateProfessor   Department of Pediatrics  Division of Gastroenterology, Hepatology and Nutrition      Shiloh Ferrer RN BSN

## 2018-04-04 NOTE — MR AVS SNAPSHOT
After Visit Summary   2018    Jaclyn Diaz    MRN: [id number]           Patient Information     Date Of Birth          1995        Visit Information        Provider Department      2018 9:45 AM Stephanie Mauro MD Peds GI        Today's Diagnoses     Liver replaced by transplant (H)        Status post liver transplantation (H)          Care Instructions    Please contact our office with any questions or concerns.      Main Transplant Phone: [phone] option 3  [facility] phone for appointments: [phone]      services: [phone]     On-call Nephrologist (Kidney Transplant) or Gastroenterologist (Liver Transplant/ TPIAT) for after hours, weekends and urgent concerns: [phone].     Transplant Coordinators:     -Shiloh Ferrer [phone]   -Danielle Fenton [phone]   -Petra Benjamin [phone]   -Kizzy Hinojosa APRN [phone]   -Florina Abdullahi APRN [phone]      Shari Maldonado- call for kidney biopsies and complex [phone].   Haley Benoit- call for pre-transplant & TPIAT complex [phone].     Fax #: [phone]            Follow-ups after your visit        Future tests that were ordered for you today     Open Standing Orders        Priority Remaining Interval Expires Ordered    Tacrolimus level Routine  monthly 2019            Who to contact     Please call your clinic at [phone] to:    Ask questions about your health    Make or cancel appointments    Discuss your medicines    Learn about your test results    Speak to your doctor            Additional Information About Your Visit        [employer] Information     [employer] gives you secure access to your electronic health record. If you see a primary care provider, you can also send messages to your care team and make appointments. If you have questions, please call your primary care clinic.  If you do not have a primary care  "provider, please call [phone] and they will assist you.      [employer] is an electronic gateway that provides easy, online access to your medical records. With [employer], you can request a clinic appointment, read your test results, renew a prescription or communicate with your care team.     To access your existing account, please contact your [facility] or call [phone] for assistance.        Care EveryWhere ID     This is your Care EveryWhere ID. This could be used by other organizations to access your [facility] medical records  [id number]        Your Vitals Were     Pulse Height BMI (Body Mass Index)             71 5' 7.84\" (172.3 cm) 26.37 kg/m2          Blood Pressure from Last 3 Encounters:   04/04/18 130/72   09/27/17 130/81   12/10/15 111/73    Weight from Last 3 Encounters:   04/04/18 172 lb 9.9 oz (78.3 kg)   09/27/17 175 lb 14.8 oz (79.8 kg)   12/10/15 166 lb 7.2 oz (75.5 kg) (91 %)*     * Growth percentiles are based on CDC 2-20 Years data.              We Performed the Following     GGT     Hepatic panel     Tacrolimus level          Where to get your medicines      These medications were sent to [id number] [address] [place] [facility]  [address] [address]     Phone:  [phone]     tacrolimus 1 MG capsule    ursodiol 300 MG capsule          Primary Care Provider Office Phone # Fax #    Jessica Mendez Joe Clark [phone]       [facility] [address] [address]        Equal Access to Services     VERO BHANDARI AH: Hadii gaurang alexander Soryan, waaxda luqadaha, qaybta kaalmada adeegyada, carlos bruner. So [facility] [phone].    ATENCIÓN: Si habla español, tiene a bates disposición servicios gratuitos de asistencia lingüística. Llame al [phone].    We comply with applicable federal civil rights laws and " [place] laws. We do not discriminate on the basis of race, color, national origin, age, disability, sex, sexual orientation, or gender identity.            Thank you!     Thank you for choosing PEDS GI  for your care. Our goal is always to provide you with excellent care. Hearing back from our patients is one way we can continue to improve our services. Please take a few minutes to complete the written survey that you may receive in the mail after your visit with us. Thank you!             Your Updated Medication List - Protect others around you: Learn how to safely use, store and throw away your medicines at www.disposemymeds.org.          This list is accurate as of 4/4/18 11:29 AM.  Always use your most recent med list.                   Brand Name Dispense Instructions for use Diagnosis    CITALOPRAM HYDROBROMIDE PO      Take 40 mg by mouth daily        tacrolimus 1 MG capsule     60 capsule    Take 1 capsule (1 mg) by mouth 2 times daily    Status post liver transplantation (H), Liver replaced by transplant (H)       ursodiol 300 MG capsule    ACTIGALL    180 capsule    Take 1 capsule (300 mg) by mouth every 12 hours    Liver replaced by transplant (H), Status post liver transplantation (H)

## 2018-04-04 NOTE — PATIENT INSTRUCTIONS
Please contact our office with any questions or concerns.      Main Transplant Phone: [phone] option 3  [facility] phone for appointments: [phone]      services: [phone]     On-call Nephrologist (Kidney Transplant) or Gastroenterologist (Liver Transplant/ TPIAT) for after hours, weekends and urgent concerns: [phone].     Transplant Coordinators:     -Shiloh Ferrer [phone]   -Danielle Fenton [phone]   -Petra Benjamin [phone]   -Kizzy Hinojosa APRN [phone]   -Florina Abdullahi APRN [phone]      Shari Maldonado- call for kidney biopsies and complex [phone].   Haley Benoit- call for pre-transplant & TPIAT complex [phone].     Fax #: [phone]

## 2018-04-04 NOTE — NURSING NOTE
Jaclyn Diaz presented to clinic today for transition to adult teaching.  The following topics were discussed: My Chart, medications, labs, primary and speciality care, and general health follow up.   Jaclyn Diaz is signed up for my chart.  Reviewed how to use application for checking labs viewing and making appointments, and sending messages to care team.   Medications were reviewed, Jaclyn Diaz know doses and purpose of medications. Currently  is completing refills and uses [email or web address] pharmacy and uses [employer] co-pay care card.  Jaclyn Diaz uses a pill box for medication administration.  Jaclyn was able to explain why she needed a transplant.  Discussed why it is important to know these details as transitioning to adult care.   Lab schedule discussed and currently Jaclyn Diaz.  Compliance is poor.  Labs are drawn at [facility].  Discussed normal lab values and why it is imporant Jaclyn Diaz be aware of lab results.  Currently Jaclyn Diaz uses My Chart as a way to view labs.  Jaclyn Diaz knows how to contact Shiloh Ferrer coordinator, also was given main transplant phone number [phone].   Jaclyn Diaz primary care provider is Dr. Gonzales.  [facility] care providers are only her Liver TX team.  Explained the differences in pediatric care and adult care, primary care provider will be needed for general health maintanance and to refill non transplant medications.  For your first adult visit have questions ready and be prepared to answer questions regarding your health and habits, the provider will want to take to you not your parents.     General health follow up discusses: see a dentist at [facility] yearly, have annual eye exams, wear sunscreen SPF 30, get annual flu shot, and have annual physical with primary care provider. She uses Implanon for birth control and needs to be changed this  summer.     She is going to work with  and establish insurance, be compliant with monthly labs, and call or my chart message with coordinators with concerns.     I will reach out to Adult coordinators and work on transition to Adult care process.

## 2018-04-05 ENCOUNTER — TELEPHONE (OUTPATIENT)
Dept: TRANSPLANT | Facility: CLINIC | Age: 23
End: 2018-04-05

## 2018-04-05 DIAGNOSIS — Z94.4 LIVER REPLACED BY TRANSPLANT (H): ICD-10-CM

## 2018-04-05 DIAGNOSIS — Z94.4 STATUS POST LIVER TRANSPLANTATION (H): ICD-10-CM

## 2018-04-05 RX ORDER — TACROLIMUS 0.5 MG/1
CAPSULE, GELATIN COATED ORAL
Qty: 180 CAPSULE | Refills: 11 | Status: SHIPPED | OUTPATIENT
Start: 2018-04-05 | End: 2019-05-13

## 2018-04-05 NOTE — TELEPHONE ENCOUNTER
Called Jaclyn and increased tacrolimus for level below goal. Asked her to repeat level next week and get an accurate 12 hour level.    She has to get brand prograf because she doesn't have insurance and her [facility] pharmacy no longer carries brand Tacrolimus.  [facility], I called and discussed her situation with the Tech, I also gave them Jared at [facility] phone number, [phone], she uses a co-pay card to get her mediations and he can help with the process if needed.

## 2018-04-06 ENCOUNTER — TELEPHONE (OUTPATIENT)
Dept: CARE COORDINATION | Facility: CLINIC | Age: 23
End: 2018-04-06

## 2018-04-06 NOTE — TELEPHONE ENCOUNTER
was asked to connect with Jaclyn regarding resources for insurance coverage.   left a vm message and encouraged a phone call back to discuss further.   will continue to assist as needed.      DONALD Samuel  Clinical   [facility]  [phone]

## 2018-04-12 ENCOUNTER — TELEPHONE (OUTPATIENT)
Dept: TRANSPLANT | Facility: CLINIC | Age: 23
End: 2018-04-12

## 2018-04-12 NOTE — TELEPHONE ENCOUNTER
Reaching out to Jaclyn because I haven't heard back to make sure she has been able to  her Prograft at the [facility] pharmacy.    Asked her to call me back.

## 2018-06-27 ENCOUNTER — TELEPHONE (OUTPATIENT)
Dept: TRANSPLANT | Facility: CLINIC | Age: 23
End: 2018-06-27

## 2018-06-27 NOTE — TELEPHONE ENCOUNTER
Left message for Jaclyn to please get labs this week or next week, as we have not had any since April.    Please call Florina and let her know where you are going so we can send orders to the correct clinic,

## 2019-03-19 ENCOUNTER — TELEPHONE (OUTPATIENT)
Dept: TRANSPLANT | Facility: CLINIC | Age: 24
End: 2019-03-19

## 2019-03-22 NOTE — TELEPHONE ENCOUNTER
Called Jaclyn, we have not had labs and I'm try to schedule an appointment with her to get her transitioned to Adult liver tx team.

## 2019-04-08 DIAGNOSIS — Z94.4 LIVER REPLACED BY TRANSPLANT (H): ICD-10-CM

## 2019-04-08 RX ORDER — TACROLIMUS 0.5 MG/1
CAPSULE, GELATIN COATED ORAL
Qty: 180 CAPSULE | Refills: 11 | OUTPATIENT
Start: 2019-04-08

## 2019-04-16 ENCOUNTER — MEDICAL CORRESPONDENCE (OUTPATIENT)
Dept: TRANSPLANT | Facility: CLINIC | Age: 24
End: 2019-04-16

## 2019-05-13 DIAGNOSIS — Z94.4 LIVER REPLACED BY TRANSPLANT (H): ICD-10-CM

## 2019-05-13 DIAGNOSIS — Z94.4 STATUS POST LIVER TRANSPLANTATION (H): ICD-10-CM

## 2019-05-13 RX ORDER — TACROLIMUS 0.5 MG/1
CAPSULE, GELATIN COATED ORAL
Qty: 180 CAPSULE | Refills: 0 | Status: SHIPPED | OUTPATIENT
Start: 2019-05-13 | End: 2019-07-15

## 2019-05-20 ENCOUNTER — TELEPHONE (OUTPATIENT)
Dept: TRANSPLANT | Facility: CLINIC | Age: 24
End: 2019-05-20

## 2019-07-03 ENCOUNTER — TELEPHONE (OUTPATIENT)
Dept: TRANSPLANT | Facility: CLINIC | Age: 24
End: 2019-07-03

## 2019-07-15 ENCOUNTER — OFFICE VISIT (OUTPATIENT)
Dept: TRANSPLANT | Facility: CLINIC | Age: 24
End: 2019-07-15
Attending: NURSE PRACTITIONER
Payer: COMMERCIAL

## 2019-07-15 VITALS
DIASTOLIC BLOOD PRESSURE: 89 MMHG | BODY MASS INDEX: 26.68 KG/M2 | HEIGHT: 69 IN | SYSTOLIC BLOOD PRESSURE: 117 MMHG | WEIGHT: 180.12 LBS | HEART RATE: 72 BPM

## 2019-07-15 DIAGNOSIS — Z94.4 LIVER REPLACED BY TRANSPLANT (H): ICD-10-CM

## 2019-07-15 DIAGNOSIS — Z94.4 STATUS POST LIVER TRANSPLANTATION (H): ICD-10-CM

## 2019-07-15 DIAGNOSIS — Z23 NEED FOR VACCINATION: Primary | ICD-10-CM

## 2019-07-15 DIAGNOSIS — Z71.87 COUNSELING FOR TRANSITION FROM PEDIATRIC TO ADULT CARE PROVIDER: ICD-10-CM

## 2019-07-15 LAB
ALBUMIN SERPL-MCNC: 3.7 G/DL (ref 3.4–5)
ALP SERPL-CCNC: 94 U/L (ref 40–150)
ALT SERPL W P-5'-P-CCNC: 28 U/L (ref 0–50)
ANION GAP SERPL CALCULATED.3IONS-SCNC: 4 MMOL/L (ref 3–14)
AST SERPL W P-5'-P-CCNC: 16 U/L (ref 0–45)
BASOPHILS # BLD AUTO: 0 10E9/L (ref 0–0.2)
BASOPHILS NFR BLD AUTO: 0.2 %
BILIRUB DIRECT SERPL-MCNC: 0.2 MG/DL (ref 0–0.2)
BILIRUB SERPL-MCNC: 0.7 MG/DL (ref 0.2–1.3)
BUN SERPL-MCNC: 16 MG/DL (ref 7–30)
CALCIUM SERPL-MCNC: 8.6 MG/DL (ref 8.5–10.1)
CHLORIDE SERPL-SCNC: 109 MMOL/L (ref 94–109)
CHOLEST SERPL-MCNC: 101 MG/DL
CO2 SERPL-SCNC: 26 MMOL/L (ref 20–32)
CREAT SERPL-MCNC: 0.74 MG/DL (ref 0.52–1.04)
DIFFERENTIAL METHOD BLD: NORMAL
EOSINOPHIL # BLD AUTO: 0.2 10E9/L (ref 0–0.7)
EOSINOPHIL NFR BLD AUTO: 3.7 %
ERYTHROCYTE [DISTWIDTH] IN BLOOD BY AUTOMATED COUNT: 11.4 % (ref 10–15)
GFR SERPL CREATININE-BSD FRML MDRD: >90 ML/MIN/{1.73_M2}
GGT SERPL-CCNC: 7 U/L (ref 0–40)
GLUCOSE SERPL-MCNC: 89 MG/DL (ref 70–99)
HCT VFR BLD AUTO: 42.5 % (ref 35–47)
HDLC SERPL-MCNC: 51 MG/DL
HGB BLD-MCNC: 15.1 G/DL (ref 11.7–15.7)
IMM GRANULOCYTES # BLD: 0 10E9/L (ref 0–0.4)
IMM GRANULOCYTES NFR BLD: 0.2 %
LDLC SERPL CALC-MCNC: 37 MG/DL
LYMPHOCYTES # BLD AUTO: 1.2 10E9/L (ref 0.8–5.3)
LYMPHOCYTES NFR BLD AUTO: 24.2 %
MAGNESIUM SERPL-MCNC: 2.1 MG/DL (ref 1.6–2.3)
MCH RBC QN AUTO: 32 PG (ref 26.5–33)
MCHC RBC AUTO-ENTMCNC: 35.5 G/DL (ref 31.5–36.5)
MCV RBC AUTO: 90 FL (ref 78–100)
MONOCYTES # BLD AUTO: 0.4 10E9/L (ref 0–1.3)
MONOCYTES NFR BLD AUTO: 7.5 %
NEUTROPHILS # BLD AUTO: 3.3 10E9/L (ref 1.6–8.3)
NEUTROPHILS NFR BLD AUTO: 64.2 %
NONHDLC SERPL-MCNC: 50 MG/DL
NRBC # BLD AUTO: 0 10*3/UL
NRBC BLD AUTO-RTO: 0 /100
PHOSPHATE SERPL-MCNC: 2.6 MG/DL (ref 2.5–4.5)
PLATELET # BLD AUTO: 160 10E9/L (ref 150–450)
POTASSIUM SERPL-SCNC: 4.9 MMOL/L (ref 3.4–5.3)
PROT SERPL-MCNC: 6.5 G/DL (ref 6.8–8.8)
RBC # BLD AUTO: 4.72 10E12/L (ref 3.8–5.2)
SODIUM SERPL-SCNC: 139 MMOL/L (ref 133–144)
TACROLIMUS BLD-MCNC: <3 UG/L (ref 5–15)
TME LAST DOSE: ABNORMAL H
TRIGL SERPL-MCNC: 64 MG/DL
WBC # BLD AUTO: 5.1 10E9/L (ref 4–11)

## 2019-07-15 PROCEDURE — 85025 COMPLETE CBC W/AUTO DIFF WBC: CPT | Performed by: PEDIATRICS

## 2019-07-15 PROCEDURE — 80076 HEPATIC FUNCTION PANEL: CPT | Performed by: PEDIATRICS

## 2019-07-15 PROCEDURE — 82977 ASSAY OF GGT: CPT | Performed by: PEDIATRICS

## 2019-07-15 PROCEDURE — G0463 HOSPITAL OUTPT CLINIC VISIT: HCPCS | Mod: ZF

## 2019-07-15 PROCEDURE — 90734 MENACWYD/MENACWYCRM VACC IM: CPT | Mod: ZF

## 2019-07-15 PROCEDURE — 84100 ASSAY OF PHOSPHORUS: CPT | Performed by: PEDIATRICS

## 2019-07-15 PROCEDURE — 87799 DETECT AGENT NOS DNA QUANT: CPT | Performed by: PEDIATRICS

## 2019-07-15 PROCEDURE — 80197 ASSAY OF TACROLIMUS: CPT | Performed by: PEDIATRICS

## 2019-07-15 PROCEDURE — 80048 BASIC METABOLIC PNL TOTAL CA: CPT | Performed by: PEDIATRICS

## 2019-07-15 PROCEDURE — 80061 LIPID PANEL: CPT | Performed by: PEDIATRICS

## 2019-07-15 PROCEDURE — 83735 ASSAY OF MAGNESIUM: CPT | Performed by: PEDIATRICS

## 2019-07-15 PROCEDURE — 96372 THER/PROPH/DIAG INJ SC/IM: CPT | Mod: ZF

## 2019-07-15 PROCEDURE — 36415 COLL VENOUS BLD VENIPUNCTURE: CPT | Performed by: PEDIATRICS

## 2019-07-15 PROCEDURE — 25000581 ZZH RX MED A9270 GY (STAT IND- M) 250: Mod: ZF

## 2019-07-15 PROCEDURE — 90620 MENB-4C VACCINE IM: CPT | Mod: ZF

## 2019-07-15 RX ORDER — TACROLIMUS 0.5 MG/1
CAPSULE, GELATIN COATED ORAL
Qty: 180 CAPSULE | Refills: 3 | Status: SHIPPED | OUTPATIENT
Start: 2019-07-15 | End: 2019-07-19

## 2019-07-15 ASSESSMENT — PAIN SCALES - GENERAL: PAINLEVEL: NO PAIN (0)

## 2019-07-15 ASSESSMENT — MIFFLIN-ST. JEOR: SCORE: 1634.76

## 2019-07-15 NOTE — PROGRESS NOTES
Return Visit for Liver Transplant, Counseling for Transition from Pediatric to Adult Care    Chief Complaint:  Chief Complaint   Patient presents with     RECHECK     pt being seen in SOT clinic for transitional teaching       HPI:    I had the pleasure of seeing Jaclyn Diaz in the Pediatric Transplant Clinic today for follow-up of Liver Transplant and counseling for transition to adult care. Jaclyn is a 23 year old female who attends clinic independantly.  Jaclyn last saw Dr. Mauro 2018.    Hepatology History:   Secondary Biliary Cirrhosis: Choledochal Cyst  Jaclyn received a  donor whole liver transplant on 2008.   No history of biliary strictures, infections-EBV or CMV, no history of rejection.    Interval History  Jaclyn is signed up for my chart.  Jaclyn does know how to use application for checking labs viewing and making appointments, and sending messages to care team.  Medications were reviewed, Jaclyn knows doses and purpose of medications. Currently she is completing refills and uses [facility] pharmacy. She was able to explain why she needed a transplant.    Compliance with labs is poor.  Labs are drawn at [facility].    Currently Jaclyn uses My Chart as a way to view labs.  Jaclyn knows how to contact Florina Abdullahi, Esthela Lopez, or Servando Tristan with the [facility] transplant team.  Jaclyn's primary care provider is Dr. Rut Clark.    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 She has an Implanon that needs to be replaced.  Today Jaclyn describes feeling anxious from time to time. Her symptoms are racing heart, and feeling like she can't catch her breath.    Review of Systems:  A comprehensive review of systems was performed and found to be negative other than noted in the HPI.    Allergies:  Jaclyn is allergic to erythromycin; ibuprofen; penicillins; and  vancomycin..    Active Medications:  Current Outpatient Medications   Medication Sig Dispense Refill     CITALOPRAM HYDROBROMIDE PO Take 40 mg by mouth daily       PROGRAF (BRAND) 0.5 MG capsule Take 3 capsules (1.5 mg) in AM and 3 capsules (1.5 mg) in  capsule 0        Immunizations:  Immunization History   Administered Date(s) Administered     DTAP (<7y) 08/22/2001     HEPA 10/06/2008, 11/19/2012     HPV 11/05/2008     HPV Quadrivalent 09/27/2017     HepB 01/03/1997, 03/03/1997, 02/09/1998, 10/06/2008     Influenza (H1N1) 11/03/2009     Influenza (IIV3) PF 01/08/2008, 10/06/2008, 09/17/2009, 09/23/2010, 10/20/2011, 11/19/2012, 10/31/2014     Influenza Vaccine IM 3yrs+ 4 Valent IIV4 11/03/2015, 09/27/2017     MMR 02/09/1998, 08/22/2001     Mantoux Tuberculin Skin Test 11/05/2008     Meningococcal (Menactra ) 11/05/2008     OPV, trivalent, live 01/03/1997, 03/03/1997, 05/01/1997     Pneumo Conj 13-V (2010&after) 11/19/2012, 11/03/2015     Pneumococcal 23 valent 11/05/2008     Poliovirus, inactivated (IPV) 08/22/2001     TRIHIBIT (DTAP/HIB, <7y) 01/03/1997, 03/03/1997, 05/01/1997, 02/09/1998     Tdap (Adacel,Boostrix) 01/08/2008     Varicella 08/02/1999, 10/06/2008        PMHx:  Past Medical History:   Diagnosis Date     Anxiety as acute reaction to exceptional (gross) stress 8/08-11/08    due to liver failure/fear of death     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            Infection History     Liver Transplant - 11/8/2008  (#1)     No infections noted for this transplant.            Problems      "Liver Transplant - 2008  (#1)     None noted for this transplant.          Non-Transplant Related Problems       Problem Resolved    2013 Depressed     2013 Self-injurious behavior     2013 Eating disorder     2012 Health Care Home     2011 Major depressive disorder, recurrent episode, moderate (H)     2010 Major depression in complete remission (H) 2011 Sibling relationship problem 2010    12/10/2009 Status post liver transplantation (H)     12/10/2009 Moderate major depression (H) 2010    10/11/2004 Asthma 2009              PSHx:    Past Surgical History:   Procedure Laterality Date     C CORRECT MALROTATION OF BOWEL  08     C TRANSPLANT LIVER,HETERTOPIC  08    Orthotopic  donor     LAPAROTOMY EXPLORATORY CHILD  2012    Procedure:LAPAROTOMY EXPLORATORY CHILD; Exploratory laparotomy, lysis of adhesions. ; Surgeon:SIXTO FLOREZ; Location:[facility] OR       FHx:  No family history on file.    SHx:  Social History     Tobacco Use     Smoking status: Never Smoker     Smokeless tobacco: Never Used   Substance Use Topics     Alcohol use: None     Drug use: None     Social History     Social History Narrative    Lives with boyfriend and works full time at [employer].       Physical Exam:    /89 (BP Location: Right arm, Patient Position: Sitting, Cuff Size: Adult Large)   Pulse 72   Ht 1.742 m (5' 8.58\")   Wt 81.7 kg (180 lb 1.9 oz)   BMI 26.92 kg/m    Blood pressure percentiles are not available for patients who are 18 years or older.    Exam:  Constitutional: healthy, alert and no distress  Head: Normocephalic. No masses, lesions, tenderness or abnormalities  Neck: Neck supple. No adenopathy. Thyroid symmetric, normal size,, Carotids without bruits.  EYE: GERALD, EOMI, fundi normal, corneas normal, no foreign bodies, no periorbital cellulitis  ENT: ENT exam normal, no neck nodes or sinus tenderness  Cardiovascular: " negative, PMI normal. No lifts, heaves, or thrills. RRR. No murmurs, clicks gallops or rub  Respiratory: negative, Percussion normal. Good diaphragmatic excursion. Lungs clear  Gastrointestinal: Abdomen soft, non-tender. BS normal. No masses, organomegaly, or hepatosplenomegaly.  : Deferred  Musculoskeletal: extremities normal- no gross deformities noted, gait normal and normal muscle tone  Skin: no suspicious lesions or rashes  Neurologic: Gait normal. Reflexes normal and symmetric. Sensation grossly WNL.  Psychiatric: mentation appears normal and affect normal/bright  Hematologic/Lymphatic/Immunologic: normal ant/post cervical, axillary, supraclavicular and inguinal nodes    Labs and Imaging:  Results for orders placed or performed in visit on 07/15/19   Tacrolimus level   Result Value Ref Range    Tacrolimus Last Dose 7/14 2300     Tacrolimus Level <3.0 (L) 5.0 - 15.0 ug/L   Lipid panel reflex to direct LDL Fasting   Result Value Ref Range    Cholesterol 101 <200 mg/dL    Triglycerides 64 <150 mg/dL    HDL Cholesterol 51 >49 mg/dL    LDL Cholesterol Calculated 37 <100 mg/dL    Non HDL Cholesterol 50 <130 mg/dL   EBV DNA PCR Quantitative Whole Blood   Result Value Ref Range    EBV DNA Copies/mL EBV DNA Not Detected EBVNEG^EBV DNA Not Detected [Copies]/mL    EBV DNA Log of Copies Not Calculated <2.7 [Log_copies]/mL   Phosphorus   Result Value Ref Range    Phosphorus 2.6 2.5 - 4.5 mg/dL   Magnesium   Result Value Ref Range    Magnesium 2.1 1.6 - 2.3 mg/dL   GGT   Result Value Ref Range    GGT 7 0 - 40 U/L   Hepatic panel   Result Value Ref Range    Bilirubin Direct 0.2 0.0 - 0.2 mg/dL    Bilirubin Total 0.7 0.2 - 1.3 mg/dL    Albumin 3.7 3.4 - 5.0 g/dL    Protein Total 6.5 (L) 6.8 - 8.8 g/dL    Alkaline Phosphatase 94 40 - 150 U/L    ALT 28 0 - 50 U/L    AST 16 0 - 45 U/L   Basic metabolic panel   Result Value Ref Range    Sodium 139 133 - 144 mmol/L    Potassium 4.9 3.4 - 5.3 mmol/L    Chloride 109 94 - 109  mmol/L    Carbon Dioxide 26 20 - 32 mmol/L    Anion Gap 4 3 - 14 mmol/L    Glucose 89 70 - 99 mg/dL    Urea Nitrogen 16 7 - 30 mg/dL    Creatinine 0.74 0.52 - 1.04 mg/dL    GFR Estimate >90 >60 mL/min/[1.73_m2]    GFR Estimate If Black >90 >60 mL/min/[1.73_m2]    Calcium 8.6 8.5 - 10.1 mg/dL   CBC with platelets differential   Result Value Ref Range    WBC 5.1 4.0 - 11.0 10e9/L    RBC Count 4.72 3.8 - 5.2 10e12/L    Hemoglobin 15.1 11.7 - 15.7 g/dL    Hematocrit 42.5 35.0 - 47.0 %    MCV 90 78 - 100 fl    MCH 32.0 26.5 - 33.0 pg    MCHC 35.5 31.5 - 36.5 g/dL    RDW 11.4 10.0 - 15.0 %    Platelet Count 160 150 - 450 10e9/L    Diff Method Automated Method     % Neutrophils 64.2 %    % Lymphocytes 24.2 %    % Monocytes 7.5 %    % Eosinophils 3.7 %    % Basophils 0.2 %    % Immature Granulocytes 0.2 %    Nucleated RBCs 0 0 /100    Absolute Neutrophil 3.3 1.6 - 8.3 10e9/L    Absolute Lymphocytes 1.2 0.8 - 5.3 10e9/L    Absolute Monocytes 0.4 0.0 - 1.3 10e9/L    Absolute Eosinophils 0.2 0.0 - 0.7 10e9/L    Absolute Basophils 0.0 0.0 - 0.2 10e9/L    Abs Immature Granulocytes 0.0 0 - 0.4 10e9/L    Absolute Nucleated RBC 0.0        I personally reviewed results of laboratory evaluation, imaging studies and past medical records that were available during this outpatient visit.      Assessment and Plan:      ICD-10-CM    1. Need for vaccination Z23 MENINGOCOCCAL VACCINE,IM (MENACTRA) [00847] AGE 11-55     MENINGOCOCCAL RP W/O MV VACCINE 2 DOSE IM (BEXSERO) [40681]   2. Liver replaced by transplant (H) Z94.4 Tacrolimus level     Lipid panel reflex to direct LDL Fasting     EBV DNA PCR Quantitative Whole Blood     Phosphorus     Magnesium     GGT     Hepatic panel     Basic metabolic panel     CBC with platelets differential     MENINGOCOCCAL VACCINE,IM (MENACTRA) [91096] AGE 11-55     MENINGOCOCCAL RP W/O MV VACCINE 2 DOSE IM (BEXSERO) [42636]     PROGRAF (BRAND) 0.5 MG capsule   3. Status post liver transplantation (H) Z94.4  Tacrolimus level     GGT     Hepatic panel   4. Counseling for transition from pediatric to adult care provider Z71.89          Serology Results     Recipient (Pre-transplant Results)     Anti-HBcAb   HBC Total:  Negative     HBsAg   HBsAg:  Negative     HBsAb   HBsAb:  .2            HBV DNA    No results on file    Anti-HCV   HCV Ab:  Negative    HCV RNA:  <25     Anti-HIV I/II   HIV Ab:  Negative            Anti-CMV   CMV Ig    CMV IgM:  <0.90     Anti-HTLV I/II   No results on file    RPR/VDRL   No results on file           EBV IgG   No results on file    EBV IgM   No results on file    EBNA   No results on file                      Liver Donor     Anti-HBcAb   HBC Total:  Negative    HBsAg   HBsAg:  Negative    HBsAb   No results on file           HBV DNA    No results on file    Anti-HCV   HCV:  Negative    Anti-HIV I/II   HIV-1:  Negative           Anti-CMV   CMV IgG:  Positive   CMV Nucleic Acid:  Positive    Anti-HTLV I/II   HTLV:  Negative    RPR/VDRL   RPR:  Negative           EBV IgG   EBV VCA IgG:  Negative    EBV IgM   EBV VCA IgM:  Negative    EBNA   No results on file                     Immunosuppression: Tacrolimus goal 4-6 Tacrolimus level low today at <3 will increase dose to 2 mg every 12 hours. Monthly labs.    Immunoprophylaxis:  PCP prophylaxis: No  Antiviral prophylaxis: No  Antifungal prophylaxis: No    Rejection: No history. One biopsy 4/3/2009 No evidence of rejection.    Infection: No concerns    Follow up with PCP on Anxiety, exchange Implanon.    Transition: Jaclyn is ready to transition to adult after she demonstrates compliance with labs, and clinic visits.  We hope to transition to Adult Hepatology fall of . The following topics were discussed: My Chart, medications, labs, primary and speciality care, and general health follow up.      Explained the differences in pediatric care and adult care, primary care provider will be needed for general health maintanance and to refill  non transplant medications. For your first adult visit have questions ready and be prepared to answer questions regarding your health and habits, the provider will want to take to you not your parents.      Plan:  Continue to work toward [place] with:    Setting up appointments    Communication with your health care team    Compliance with monthly labs     Jaclyn will need the follow adult providers:    [place] care [place] Cuevas [facility] [place]    GYN/Women's Health- PCP manages for Jaclyn    GI: With Transition to Adult Hepatology [facility]- prefers female, plan to transition to Dr. Emily Mcclure    Dermatology- for yearly skin checks due to increased risk for skin cancer due to immunosuppression medications    Opthomology- Yearly    Dental- At least Annually     Immunizations: Due for Menactra and Bexero today. Yearly flu shot each fall recommended.     Patient Education: Today counseling/educating provided the patient regarding transition to adult care, medications, lab interpertations, the importance of knowing medical history, differences of Adult and Pediatric care, patient s transplant status, patients symptoms, physical exam and evaluation results findings, tentative diagnosis as well as the treatment plan (Including but not limited to possible side effects and complications related to the disease, treatment modalities and intervention(s).     Jaclyn expressed understanding and consent. Jaclyn was receptive and ready to learn; no apparent learning barriers were identified.      Monthly labs are recommended for Jaclyn to monitor Liver function and immunosuppression drug levels.    Health Maintenance: Live vaccines are contraindicated due to immunosuppression.    Jaclyn must have all other vaccines updated in a timely fashion including an annual influenza vaccine.  Jaclyn must be seen by the dentist annually. We recommend using sunscreen SPF 30 or higher due to increased risk of skin  cancer.  Over the counter medications should be checked prior to use to ensure they are safe in patients with liver transplant.    Follow up: Return in about 4 weeks (around 8/12/2019). Monthly Liver Transplant labs. Please return sooner should Jaclyn become symptomatic. For any questions or concerns, feel free to contact the transplant coordinators   at [phone].    Sincerely,    GALE Jackson CNP   Pediatric Solid Organ Transplant    CC:   Patient Care Team:  Jessica German as PCP - General (Family Practice)  Stephanie Mauro MD as MD (Pediatrics)  Shiloh Ferrer, RN as Registered Nurse  Adam Lopez CMA Moe, Tracy A, APRN CNP as Nurse Practitioner (Nurse Practitioner - Pediatrics)  JESSICA GERMAN    Copy to patient  Choco Espinoza [place]   [address] [facility] [place] [address]

## 2019-07-15 NOTE — LETTER
7/15/2019      RE: Jaclyn Diaz  [address] [place] [address]       Return Visit for Liver Transplant, Counseling for Transition from Pediatric to Adult Care    Chief Complaint:  Chief Complaint   Patient presents with     RECHECK     pt being seen in SOT clinic for transitional teaching       HPI:    I had the pleasure of seeing Jaclyn Diaz in the Pediatric Transplant Clinic today for follow-up of Liver Transplant and counseling for transition to adult care. Jaclyn is a 23 year old female who attends clinic independantly.  Jaclyn last saw Dr. Mauro 2018.    Hepatology History:   Secondary Biliary Cirrhosis: Choledochal Cyst  Jaclyn received a  donor whole liver transplant on 2008.   No history of biliary strictures, infections-EBV or CMV, no history of rejection.    Interval History  Jaclyn is signed up for my chart.  Jaclyn does know how to use application for checking labs viewing and making appointments, and sending messages to care team.  Medications were reviewed, Jaclyn knows doses and purpose of medications. Currently she is completing refills and uses [facility] pharmacy. She was able to explain why she needed a transplant.    Compliance with labs is poor.  Labs are drawn at [facility].    Currently Jaclyn uses My Chart as a way to view labs.  Jaclyn knows how to contact Florina Abdullahi, Esthela Lopez, or Servando Tristan with the [facility] transplant team.  Jaclyn's primary care provider is Dr. Rut Clark.    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 She has an Implanon that needs to be replaced.  Today Jaclyn describes feeling anxious from time to time. Her symptoms are racing heart, and feeling like she can't catch her breath.    Review of Systems:  A comprehensive review of systems was performed and found to be negative other than  noted in the HPI.    Allergies:  Jaclyn is allergic to erythromycin; ibuprofen; penicillins; and vancomycin..    Active Medications:  Current Outpatient Medications   Medication Sig Dispense Refill     CITALOPRAM HYDROBROMIDE PO Take 40 mg by mouth daily       PROGRAF (BRAND) 0.5 MG capsule Take 3 capsules (1.5 mg) in AM and 3 capsules (1.5 mg) in  capsule 0        Immunizations:  Immunization History   Administered Date(s) Administered     DTAP (<7y) 08/22/2001     HEPA 10/06/2008, 11/19/2012     HPV 11/05/2008     HPV Quadrivalent 09/27/2017     HepB 01/03/1997, 03/03/1997, 02/09/1998, 10/06/2008     Influenza (H1N1) 11/03/2009     Influenza (IIV3) PF 01/08/2008, 10/06/2008, 09/17/2009, 09/23/2010, 10/20/2011, 11/19/2012, 10/31/2014     Influenza Vaccine IM 3yrs+ 4 Valent IIV4 11/03/2015, 09/27/2017     MMR 02/09/1998, 08/22/2001     Mantoux Tuberculin Skin Test 11/05/2008     Meningococcal (Menactra ) 11/05/2008     OPV, trivalent, live 01/03/1997, 03/03/1997, 05/01/1997     Pneumo Conj 13-V (2010&after) 11/19/2012, 11/03/2015     Pneumococcal 23 valent 11/05/2008     Poliovirus, inactivated (IPV) 08/22/2001     TRIHIBIT (DTAP/HIB, <7y) 01/03/1997, 03/03/1997, 05/01/1997, 02/09/1998     Tdap (Adacel,Boostrix) 01/08/2008     Varicella 08/02/1999, 10/06/2008        PMHx:  Past Medical History:   Diagnosis Date     Anxiety as acute reaction to exceptional (gross) stress 8/08-11/08    due to liver failure/fear of death     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            Infection History     Liver  "Transplant - 2008  (#1)     No infections noted for this transplant.            Problems     Liver Transplant - 2008  (#1)     None noted for this transplant.          Non-Transplant Related Problems       Problem Resolved    2013 Depressed     2013 Self-injurious behavior     2013 Eating disorder     2012 Health Care Home     2011 Major depressive disorder, recurrent episode, moderate (H)     2010 Major depression in complete remission (H) 2011 Sibling relationship problem 2010    12/10/2009 Status post liver transplantation (H)     12/10/2009 Moderate major depression (H) 2010    10/11/2004 Asthma 2009              PSHx:    Past Surgical History:   Procedure Laterality Date     C CORRECT MALROTATION OF BOWEL  08     C TRANSPLANT LIVER,HETERTOPIC  08    Orthotopic  donor     LAPAROTOMY EXPLORATORY CHILD  2012    Procedure:LAPAROTOMY EXPLORATORY CHILD; Exploratory laparotomy, lysis of adhesions. ; Surgeon:SIXTO FLOREZ; Location:[facility] OR       FHx:  No family history on file.    SHx:  Social History     Tobacco Use     Smoking status: Never Smoker     Smokeless tobacco: Never Used   Substance Use Topics     Alcohol use: None     Drug use: None     Social History     Social History Narrative    Lives with boyfriend and works full time at [employer].       Physical Exam:    /89 (BP Location: Right arm, Patient Position: Sitting, Cuff Size: Adult Large)   Pulse 72   Ht 1.742 m (5' 8.58\")   Wt 81.7 kg (180 lb 1.9 oz)   BMI 26.92 kg/m     Blood pressure percentiles are not available for patients who are 18 years or older.    Exam:  Constitutional: healthy, alert and no distress  Head: Normocephalic. No masses, lesions, tenderness or abnormalities  Neck: Neck supple. No adenopathy. Thyroid symmetric, normal size,, Carotids without bruits.  EYE: GERALD, EOMI, fundi normal, corneas normal, no foreign bodies, no " periorbital cellulitis  ENT: ENT exam normal, no neck nodes or sinus tenderness  Cardiovascular: negative, PMI normal. No lifts, heaves, or thrills. RRR. No murmurs, clicks gallops or rub  Respiratory: negative, Percussion normal. Good diaphragmatic excursion. Lungs clear  Gastrointestinal: Abdomen soft, non-tender. BS normal. No masses, organomegaly, or hepatosplenomegaly.  : Deferred  Musculoskeletal: extremities normal- no gross deformities noted, gait normal and normal muscle tone  Skin: no suspicious lesions or rashes  Neurologic: Gait normal. Reflexes normal and symmetric. Sensation grossly WNL.  Psychiatric: mentation appears normal and affect normal/bright  Hematologic/Lymphatic/Immunologic: normal ant/post cervical, axillary, supraclavicular and inguinal nodes    Labs and Imaging:  Results for orders placed or performed in visit on 07/15/19   Tacrolimus level   Result Value Ref Range    Tacrolimus Last Dose 7/14 2300     Tacrolimus Level <3.0 (L) 5.0 - 15.0 ug/L   Lipid panel reflex to direct LDL Fasting   Result Value Ref Range    Cholesterol 101 <200 mg/dL    Triglycerides 64 <150 mg/dL    HDL Cholesterol 51 >49 mg/dL    LDL Cholesterol Calculated 37 <100 mg/dL    Non HDL Cholesterol 50 <130 mg/dL   EBV DNA PCR Quantitative Whole Blood   Result Value Ref Range    EBV DNA Copies/mL EBV DNA Not Detected EBVNEG^EBV DNA Not Detected [Copies]/mL    EBV DNA Log of Copies Not Calculated <2.7 [Log_copies]/mL   Phosphorus   Result Value Ref Range    Phosphorus 2.6 2.5 - 4.5 mg/dL   Magnesium   Result Value Ref Range    Magnesium 2.1 1.6 - 2.3 mg/dL   GGT   Result Value Ref Range    GGT 7 0 - 40 U/L   Hepatic panel   Result Value Ref Range    Bilirubin Direct 0.2 0.0 - 0.2 mg/dL    Bilirubin Total 0.7 0.2 - 1.3 mg/dL    Albumin 3.7 3.4 - 5.0 g/dL    Protein Total 6.5 (L) 6.8 - 8.8 g/dL    Alkaline Phosphatase 94 40 - 150 U/L    ALT 28 0 - 50 U/L    AST 16 0 - 45 U/L   Basic metabolic panel   Result Value Ref  Range    Sodium 139 133 - 144 mmol/L    Potassium 4.9 3.4 - 5.3 mmol/L    Chloride 109 94 - 109 mmol/L    Carbon Dioxide 26 20 - 32 mmol/L    Anion Gap 4 3 - 14 mmol/L    Glucose 89 70 - 99 mg/dL    Urea Nitrogen 16 7 - 30 mg/dL    Creatinine 0.74 0.52 - 1.04 mg/dL    GFR Estimate >90 >60 mL/min/[1.73_m2]    GFR Estimate If Black >90 >60 mL/min/[1.73_m2]    Calcium 8.6 8.5 - 10.1 mg/dL   CBC with platelets differential   Result Value Ref Range    WBC 5.1 4.0 - 11.0 10e9/L    RBC Count 4.72 3.8 - 5.2 10e12/L    Hemoglobin 15.1 11.7 - 15.7 g/dL    Hematocrit 42.5 35.0 - 47.0 %    MCV 90 78 - 100 fl    MCH 32.0 26.5 - 33.0 pg    MCHC 35.5 31.5 - 36.5 g/dL    RDW 11.4 10.0 - 15.0 %    Platelet Count 160 150 - 450 10e9/L    Diff Method Automated Method     % Neutrophils 64.2 %    % Lymphocytes 24.2 %    % Monocytes 7.5 %    % Eosinophils 3.7 %    % Basophils 0.2 %    % Immature Granulocytes 0.2 %    Nucleated RBCs 0 0 /100    Absolute Neutrophil 3.3 1.6 - 8.3 10e9/L    Absolute Lymphocytes 1.2 0.8 - 5.3 10e9/L    Absolute Monocytes 0.4 0.0 - 1.3 10e9/L    Absolute Eosinophils 0.2 0.0 - 0.7 10e9/L    Absolute Basophils 0.0 0.0 - 0.2 10e9/L    Abs Immature Granulocytes 0.0 0 - 0.4 10e9/L    Absolute Nucleated RBC 0.0        I personally reviewed results of laboratory evaluation, imaging studies and past medical records that were available during this outpatient visit.      Assessment and Plan:      ICD-10-CM    1. Need for vaccination Z23 MENINGOCOCCAL VACCINE,IM (MENACTRA) [71214] AGE 11-55     MENINGOCOCCAL RP W/O MV VACCINE 2 DOSE IM (BEXSERO) [22221]   2. Liver replaced by transplant (H) Z94.4 Tacrolimus level     Lipid panel reflex to direct LDL Fasting     EBV DNA PCR Quantitative Whole Blood     Phosphorus     Magnesium     GGT     Hepatic panel     Basic metabolic panel     CBC with platelets differential     MENINGOCOCCAL VACCINE,IM (MENACTRA) [71125] AGE 11-55     MENINGOCOCCAL RP W/O MV VACCINE 2 DOSE IM  (BEXSERO) [45288]     PROGRAF (BRAND) 0.5 MG capsule   3. Status post liver transplantation (H) Z94.4 Tacrolimus level     GGT     Hepatic panel   4. Counseling for transition from pediatric to adult care provider Z71.89          Serology Results     Recipient (Pre-transplant Results)     Anti-HBcAb   HBC Total:  Negative     HBsAg   HBsAg:  Negative     HBsAb   HBsAb:  .2            HBV DNA    No results on file    Anti-HCV   HCV Ab:  Negative    HCV RNA:  <25     Anti-HIV I/II   HIV Ab:  Negative            Anti-CMV   CMV Ig    CMV IgM:  <0.90     Anti-HTLV I/II   No results on file    RPR/VDRL   No results on file           EBV IgG   No results on file    EBV IgM   No results on file    EBNA   No results on file                      Liver Donor     Anti-HBcAb   HBC Total:  Negative    HBsAg   HBsAg:  Negative    HBsAb   No results on file           HBV DNA    No results on file    Anti-HCV   HCV:  Negative    Anti-HIV I/II   HIV-1:  Negative           Anti-CMV   CMV IgG:  Positive   CMV Nucleic Acid:  Positive    Anti-HTLV I/II   HTLV:  Negative    RPR/VDRL   RPR:  Negative           EBV IgG   EBV VCA IgG:  Negative    EBV IgM   EBV VCA IgM:  Negative    EBNA   No results on file                     Immunosuppression: Tacrolimus goal 4-6 Tacrolimus level low today at <3 will increase dose to 2 mg every 12 hours. Monthly labs.    Immunoprophylaxis:  PCP prophylaxis: No  Antiviral prophylaxis: No  Antifungal prophylaxis: No    Rejection: No history. One biopsy 4/3/2009 No evidence of rejection.    Infection: No concerns    Follow up with PCP on Anxiety, exchange Implanon.    Transition: Jaclyn is ready to transition to adult after she demonstrates compliance with labs, and clinic visits.  We hope to transition to Adult Hepatology . The following topics were discussed: My Chart, medications, labs, primary and speciality care, and general health follow up.      Explained the differences in pediatric  care and adult care, primary care provider will be needed for general health maintanance and to refill non transplant medications. For your first adult visit have questions ready and be prepared to answer questions regarding your health and habits, the provider will want to take to you not your parents.      Plan:  Continue to work toward [place] with:    Setting up appointments    Communication with your health care team    Compliance with monthly labs     Jaclyn will need the follow adult providers:    Primary care physician- Dr. Rut Cuevas [facility] [place] [place]    GYN/Women's Health-  PCP manages for Jaclyn    GI: With Transition to Adult Hepatology [facility]- prefers female, plan to transition to Dr. Emily Mcclure    Dermatology- for yearly skin checks due to increased risk for skin cancer due to immunosuppression medications    Opthomology- Yearly    Dental- At least Annually     Immunizations: Due for Menactra and Bexero today. Yearly flu shot each fall recommended.     Patient Education: Today counseling/educating provided the patient regarding transition to adult care, medications, lab interpertations, the importance of knowing medical history, differences of Adult and Pediatric care, patient s transplant status, patients symptoms, physical exam and evaluation results findings, tentative diagnosis as well as the treatment plan (Including but not limited to possible side effects and complications related to the disease, treatment modalities and intervention(s).     Jaclyn expressed understanding and consent. Jaclyn was receptive and ready to learn; no apparent learning barriers were identified.      Monthly labs are recommended for Jaclyn to monitor Liver function and immunosuppression drug levels.    Health Maintenance: Live vaccines are contraindicated due to immunosuppression.    Jaclyn must have all other vaccines updated in a timely fashion including an annual influenza vaccine.  Jaclyn must be seen by  the dentist annually. We recommend using sunscreen SPF 30 or higher due to increased risk of skin cancer.  Over the counter medications should be checked prior to use to ensure they are safe in patients with liver transplant.    Follow up: Return in about 4 weeks (around 8/12/2019). Monthly Liver Transplant labs. Please return sooner should Jaclyn become symptomatic. For any questions or concerns, feel free to contact the transplant coordinators   at [phone].    Sincerely,    GALE Jackson CNP   Pediatric Solid Organ Transplant    CC:   Patient Care Team:  Jessica Gomez as PCP - General (Family Practice)  Zunilda, Stephanie Templeton MD as MD (Pediatrics)  Shiloh Ferrer RN as Registered Nurse  Adam Lopez CMA      Copy to patient  Choco Espinoza Gary   [address] [place] [address]

## 2019-07-15 NOTE — NURSING NOTE
"[facility] [[id number]]  Chief Complaint   Patient presents with     RECHECK     pt being seen in SOT clinic for transitional teaching     Initial /89 (BP Location: Right arm, Patient Position: Sitting, Cuff Size: Adult Large)   Pulse 72   Ht 5' 8.58\" (174.2 cm)   Wt 180 lb 1.9 oz (81.7 kg)   BMI 26.92 kg/m   Estimated body mass index is 26.92 kg/m  as calculated from the following:    Height as of this encounter: 5' 8.58\" (174.2 cm).    Weight as of this encounter: 180 lb 1.9 oz (81.7 kg).  Medication Reconciliation: unable or not appropriate to perform   Swetha Jacobs LPN      "

## 2019-07-15 NOTE — PATIENT INSTRUCTIONS
Monthly labs- at [facility]  Follow up with Florina Abdullahi for continued transition to Adult readiness in 2 months.    STOP AT THE  TO SCHEDULE YOUR FOLLOW UP APPOINTMENTS, LABS, and IMAGING.  [facility] phone for appointments: [phone]  Please contact our office with any questions or concerns.      services: [phone]     On-call Nephrologist (Kidney Transplant) or Gastroenterologist (Liver Transplant/ TPIAT) for after hours, weekends and urgent concerns: [phone].     Transplant Team:     -Shiloh Ferrer, [phone]   -Danielle Fenton [phone]   -Servando Harmon [phone]   -GALE Chong [phone]   -GALE Conteh [phone]   -Fax #: [phone]    -Haley Benoit- call for pre-transplant & TPIAT complex [phone].   -Esthela Lopez- call for post transplant complex [phone]     To have the coordinators paged if needed call    Main Transplant Phone: [phone] option 3,

## 2019-07-17 LAB
EBV DNA # SPEC NAA+PROBE: NORMAL {COPIES}/ML
EBV DNA SPEC NAA+PROBE-LOG#: NORMAL {LOG_COPIES}/ML

## 2019-07-19 RX ORDER — TACROLIMUS 0.5 MG/1
CAPSULE, GELATIN COATED ORAL
Qty: 240 CAPSULE | Refills: 11 | Status: SHIPPED | OUTPATIENT
Start: 2019-07-19 | End: 2019-10-11

## 2019-08-19 ENCOUNTER — OFFICE VISIT (OUTPATIENT)
Dept: TRANSPLANT | Facility: CLINIC | Age: 24
End: 2019-08-19
Attending: NURSE PRACTITIONER
Payer: COMMERCIAL

## 2019-08-19 VITALS
HEIGHT: 68 IN | DIASTOLIC BLOOD PRESSURE: 86 MMHG | BODY MASS INDEX: 27.4 KG/M2 | WEIGHT: 180.78 LBS | HEART RATE: 78 BPM | SYSTOLIC BLOOD PRESSURE: 128 MMHG

## 2019-08-19 DIAGNOSIS — Z94.4 STATUS POST LIVER TRANSPLANTATION (H): Primary | ICD-10-CM

## 2019-08-19 DIAGNOSIS — Z23 NEED FOR VACCINATION: ICD-10-CM

## 2019-08-19 PROCEDURE — G0463 HOSPITAL OUTPT CLINIC VISIT: HCPCS | Mod: ZF

## 2019-08-19 ASSESSMENT — PAIN SCALES - GENERAL: PAINLEVEL: NO PAIN (0)

## 2019-08-19 ASSESSMENT — MIFFLIN-ST. JEOR: SCORE: 1632.12

## 2019-08-19 NOTE — PATIENT INSTRUCTIONS
Labs and Tdap on       STOP AT THE  TO SCHEDULE YOUR FOLLOW UP APPOINTMENTS, LABS, and IMAGING.  [facility] phone for appointments: [phone]  Please contact our office with any questions or concerns.      services: [phone]     On-call Nephrologist (Kidney Transplant) or Gastroenterologist (Liver Transplant/ TPIAT) for after hours, weekends and urgent concerns: [phone].     Transplant Team:     -Shiloh Ferrer [phone]   -Danielle Fenton [phone]   -Servando Harmon [phone]   -Kizzy Hinojosa, APRN [phone]   -Florina Abdullahi APRN [phone]   -Fax #: [phone]    -Haley Benoit- call for pre-transplant & TPIAT complex [phone].   -Esthela Lopez- call for post transplant complex [phone]     To have the coordinators paged if needed call    Main Transplant Phone: [phone] option 3,

## 2019-08-19 NOTE — LETTER
2019      RE: Jaclyn Diaz  [address] [facility] [place] [address]       Return Visit for Liver Transplant, Counseling for Transition from Pediatric to Adult Care    Chief Complaint:  Chief Complaint   Patient presents with     RECHECK     Transplant follow up.       HPI:    I had the pleasure of seeing Jaclyn Diaz in the Pediatric Transplant Clinic today for follow-up of Liver Transplant and counseling for transition to adult care. Jaclyn is a 23 year old female who attends clinic independantly. She has had no illness or hospitalizations since I last saw her in clinic 07/15/2019    Hepatology History:   Secondary Biliary Cirrhosis: Choledochal Cyst  Jaclyn received a  donor whole liver transplant on 2008.   No history of biliary strictures, infections-EBV or CMV, no history of rejection.    Interval History  Jaclyn is signed up for my chart, she is using the dylon on her phone.  Jaclyn knows doses and purpose of medications. Currently she is completing refills and uses [facility] pharmacy.   Compliance with labs is improving.  Labs are drawn at [facility]/or the [facility]. Jaclyn is due for labs but took her tacrolimus today so will plan to get labs . Tacrolimus was increased after July's level came back <3.  Jaclyn continues to work at [employer], insurance is active.  She has an Implanon that needs to be replaced.  Jaclyn's anxiety is under control.    Review of Systems:  A comprehensive review of systems was performed and found to be negative other than noted in the HPI.    Allergies:  Jaclyn is allergic to erythromycin; ibuprofen; penicillins; and vancomycin..    Active Medications:  Current Outpatient Medications   Medication Sig Dispense Refill     CITALOPRAM HYDROBROMIDE PO Take 40 mg by mouth daily       PROGRAF (BRAND) 0.5 MG capsule Take 4 capsules (2 mg) in AM and 4 capsules (2 mg) in PM Dose increase. 240 capsule  11        Immunizations:  Immunization History   Administered Date(s) Administered     DTAP (<7y) 08/22/2001     HEPA 10/06/2008, 11/19/2012     HPV 11/05/2008     HPV Quadrivalent 09/27/2017     HepB 01/03/1997, 03/03/1997, 02/09/1998, 10/06/2008     Influenza (H1N1) 11/03/2009     Influenza (IIV3) PF 01/08/2008, 10/06/2008, 09/17/2009, 09/23/2010, 10/20/2011, 11/19/2012, 10/31/2014     Influenza Vaccine IM 3yrs+ 4 Valent IIV4 11/03/2015, 09/27/2017     MMR 02/09/1998, 08/22/2001     Mantoux Tuberculin Skin Test 11/05/2008     Meningococcal (Bexsero ) 07/15/2019     Meningococcal (Menactra ) 11/05/2008, 07/15/2019     OPV, trivalent, live 01/03/1997, 03/03/1997, 05/01/1997     Pneumo Conj 13-V (2010&after) 11/19/2012, 11/03/2015     Pneumococcal 23 valent 11/05/2008     Poliovirus, inactivated (IPV) 08/22/2001     TRIHIBIT (DTAP/HIB, <7y) 01/03/1997, 03/03/1997, 05/01/1997, 02/09/1998     Tdap (Adacel,Boostrix) 01/08/2008     Varicella 08/02/1999, 10/06/2008        PMHx:  Past Medical History:   Diagnosis Date     Anxiety as acute reaction to exceptional (gross) stress 8/08-11/08    due to liver failure/fear of death     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            Infection History     Liver Transplant - 11/8/2008  (#1)     No infections noted for this transplant.            Problems     Liver Transplant - 11/8/2008  (#1)     None noted for this transplant.          Non-Transplant Related Problems       Problem Resolved    9/26/2013 Depressed     2/11/2013 Self-injurious behavior     2/11/2013 Eating  "disorder     2012 Health Care Home     2011 Major depressive disorder, recurrent episode, moderate (H)     2010 Major depression in complete remission (H) 2011 Sibling relationship problem 2010    12/10/2009 Status post liver transplantation (H)     12/10/2009 Moderate major depression (H) 2010    10/11/2004 Asthma 2009              PSHx:    Past Surgical History:   Procedure Laterality Date     C CORRECT MALROTATION OF BOWEL  08     C TRANSPLANT LIVER,HETERTOPIC  08    Orthotopic  donor     LAPAROTOMY EXPLORATORY CHILD  2012    Procedure:LAPAROTOMY EXPLORATORY CHILD; Exploratory laparotomy, lysis of adhesions. ; Surgeon:SIXTO FLOREZ; Location:[facility] OR       FHx:  History reviewed. No pertinent family history.    SHx:  Social History     Tobacco Use     Smoking status: Never Smoker     Smokeless tobacco: Never Used   Substance Use Topics     Alcohol use: None     Drug use: None     Social History     Social History Narrative    Lives with boyfriend and works full time at [employer].       Physical Exam:    /86 (BP Location: Right arm, Patient Position: Sitting, Cuff Size: Adult Regular)   Pulse 78   Ht 1.733 m (5' 8.23\")   Wt 82 kg (180 lb 12.4 oz)   BMI 27.30 kg/m     Blood pressure percentiles are not available for patients who are 18 years or older.    Exam:  Constitutional: healthy, alert and no distress  Head: Normocephalic. No masses, lesions, tenderness or abnormalities  Neck: Neck supple. No adenopathy. Thyroid symmetric, normal size,, Carotids without bruits.  EYE: GERALD, EOMI, fundi normal, corneas normal, no foreign bodies, no periorbital cellulitis  ENT: ENT exam normal, no neck nodes or sinus tenderness  Cardiovascular: negative, PMI normal. No lifts, heaves, or thrills. RRR. No murmurs, clicks gallops or rub  Respiratory: negative, Percussion normal. Good diaphragmatic excursion. Lungs clear  Gastrointestinal: " Abdomen soft, non-tender. BS normal. No masses, organomegaly, or hepatosplenomegaly.  : Deferred  Musculoskeletal: extremities normal- no gross deformities noted, gait normal and normal muscle tone  Skin: no suspicious lesions or rashes  Neurologic: Gait normal. Reflexes normal and symmetric. Sensation grossly WNL.  Psychiatric: mentation appears normal and affect normal/bright  Hematologic/Lymphatic/Immunologic: normal ant/post cervical, axillary, supraclavicular and inguinal nodes    Labs and Imaging:  Results for orders placed or performed in visit on 07/15/19   Tacrolimus level   Result Value Ref Range    Tacrolimus Last Dose 7/14 2300     Tacrolimus Level <3.0 (L) 5.0 - 15.0 ug/L   Lipid panel reflex to direct LDL Fasting   Result Value Ref Range    Cholesterol 101 <200 mg/dL    Triglycerides 64 <150 mg/dL    HDL Cholesterol 51 >49 mg/dL    LDL Cholesterol Calculated 37 <100 mg/dL    Non HDL Cholesterol 50 <130 mg/dL   EBV DNA PCR Quantitative Whole Blood   Result Value Ref Range    EBV DNA Copies/mL EBV DNA Not Detected EBVNEG^EBV DNA Not Detected [Copies]/mL    EBV DNA Log of Copies Not Calculated <2.7 [Log_copies]/mL   Phosphorus   Result Value Ref Range    Phosphorus 2.6 2.5 - 4.5 mg/dL   Magnesium   Result Value Ref Range    Magnesium 2.1 1.6 - 2.3 mg/dL   GGT   Result Value Ref Range    GGT 7 0 - 40 U/L   Hepatic panel   Result Value Ref Range    Bilirubin Direct 0.2 0.0 - 0.2 mg/dL    Bilirubin Total 0.7 0.2 - 1.3 mg/dL    Albumin 3.7 3.4 - 5.0 g/dL    Protein Total 6.5 (L) 6.8 - 8.8 g/dL    Alkaline Phosphatase 94 40 - 150 U/L    ALT 28 0 - 50 U/L    AST 16 0 - 45 U/L   Basic metabolic panel   Result Value Ref Range    Sodium 139 133 - 144 mmol/L    Potassium 4.9 3.4 - 5.3 mmol/L    Chloride 109 94 - 109 mmol/L    Carbon Dioxide 26 20 - 32 mmol/L    Anion Gap 4 3 - 14 mmol/L    Glucose 89 70 - 99 mg/dL    Urea Nitrogen 16 7 - 30 mg/dL    Creatinine 0.74 0.52 - 1.04 mg/dL    GFR Estimate >90 >60  mL/min/[1.73_m2]    GFR Estimate If Black >90 >60 mL/min/[1.73_m2]    Calcium 8.6 8.5 - 10.1 mg/dL   CBC with platelets differential   Result Value Ref Range    WBC 5.1 4.0 - 11.0 10e9/L    RBC Count 4.72 3.8 - 5.2 10e12/L    Hemoglobin 15.1 11.7 - 15.7 g/dL    Hematocrit 42.5 35.0 - 47.0 %    MCV 90 78 - 100 fl    MCH 32.0 26.5 - 33.0 pg    MCHC 35.5 31.5 - 36.5 g/dL    RDW 11.4 10.0 - 15.0 %    Platelet Count 160 150 - 450 10e9/L    Diff Method Automated Method     % Neutrophils 64.2 %    % Lymphocytes 24.2 %    % Monocytes 7.5 %    % Eosinophils 3.7 %    % Basophils 0.2 %    % Immature Granulocytes 0.2 %    Nucleated RBCs 0 0 /100    Absolute Neutrophil 3.3 1.6 - 8.3 10e9/L    Absolute Lymphocytes 1.2 0.8 - 5.3 10e9/L    Absolute Monocytes 0.4 0.0 - 1.3 10e9/L    Absolute Eosinophils 0.2 0.0 - 0.7 10e9/L    Absolute Basophils 0.0 0.0 - 0.2 10e9/L    Abs Immature Granulocytes 0.0 0 - 0.4 10e9/L    Absolute Nucleated RBC 0.0        I personally reviewed results of laboratory evaluation, imaging studies and past medical records that were available during this outpatient visit.      Assessment and Plan:    No diagnosis found.      Serology Results     Recipient (Pre-transplant Results)     Anti-HBcAb   HBC Total:  Negative     HBsAg   HBsAg:  Negative     HBsAb   HBsAb:  .2            HBV DNA    No results on file    Anti-HCV   HCV Ab:  Negative    HCV RNA:  <25     Anti-HIV I/II   HIV Ab:  Negative            Anti-CMV   CMV Ig    CMV IgM:  <0.90     Anti-HTLV I/II   No results on file    RPR/VDRL   No results on file           EBV IgG   No results on file    EBV IgM   No results on file    EBNA   No results on file                      Liver Donor     Anti-HBcAb   HBC Total:  Negative    HBsAg   HBsAg:  Negative    HBsAb   No results on file           HBV DNA    No results on file    Anti-HCV   HCV:  Negative    Anti-HIV I/II   HIV-1:  Negative           Anti-CMV   CMV IgG:  Positive   CMV Nucleic Acid:   Positive    Anti-HTLV I/II   HTLV:  Negative    RPR/VDRL   RPR:  Negative           EBV IgG   EBV VCA IgG:  Negative    EBV IgM   EBV VCA IgM:  Negative    EBNA   No results on file                     Immunosuppression: Tacrolimus goal 4-6 Tacrolimus dose to 2 mg every 12 hours. Monthly labs.    Immunoprophylaxis:  PCP prophylaxis: No  Antiviral prophylaxis: No  Antifungal prophylaxis: No    Rejection: No history. One biopsy 4/3/2009 No evidence of rejection.    Infection: No concerns    Follow up with PCP on Anxiety, exchange Implanon.    Transition: Jaclyn is ready to transition to adult after she demonstrates compliance with labs, and clinic visits.  We hope to transition to Adult Hepatology fall of 2019 after her appointment with Dr. Mauro. The following topics were discussed: My Chart, medications, labs, primary and speciality care, and general health follow up.      Explained the differences in pediatric care and adult care, primary care provider will be needed for general health maintanance and to refill non transplant medications. For your first adult visit have questions ready and be prepared to answer questions regarding your health and habits, the provider will want to take to you not your parents.      Plan:  Continue to work toward [place] with:    Setting up appointments    Communication with your health care team    Compliance with monthly labs     Jaclyn will need the follow adult providers:    Primary care physician- Dr. Rut Cuevas [facility]    GYN/Women's Health- PCP manages for Jaclyn    GI: With Transition to Adult Hepatology [facility] [place]- prefers female, plan to transition to Dr. Emily Mcclure    Dermatology- for yearly skin checks due to increased risk for skin cancer due to immunosuppression medications    Opthomology- Yearly    Dental- At least Annually     Immunizations: Due for TDap. Yearly flu shot each fall recommended.     Patient Education: Today counseling/educating  provided the patient regarding transition to adult care, medications, lab interpertations, the importance of knowing medical history, differences of Adult and Pediatric care, patient s transplant status, patients symptoms, physical exam and evaluation results findings, tentative diagnosis as well as the treatment plan (Including but not limited to possible side effects and complications related to the disease, treatment modalities and intervention(s).     Jaclyn expressed understanding and consent. Jaclyn was receptive and ready to learn; no apparent learning barriers were identified.      Monthly labs are recommended for Jaclyn to monitor Liver function and immunosuppression drug levels.    Health Maintenance: Live vaccines are contraindicated due to immunosuppression.    Jaclyn must have all other vaccines updated in a timely fashion including an annual influenza vaccine.  Jaclyn must be seen by the dentist annually. We recommend using sunscreen SPF 30 or higher due to increased risk of skin cancer.  Over the counter medications should be checked prior to use to ensure they are safe in patients with liver transplant.    Follow up: Return in about 4 weeks (around 9/16/2019). Monthly Liver Transplant labs. Please return sooner should Jaclyn become symptomatic. For any questions or concerns, feel free to contact the transplant coordinators   at [phone].    Sincerely,    GALE Jackson CNP   Pediatric Solid Organ Transplant    CC:   Patient Care Team:  Jessica Gomez as PCP - General (Family Practice)  Stephanie Mauro MD as MD (Pediatrics)  Adam Lopez CMA      Copy to patient  Choco Espinoza [place]   [address] [facility] [facility] [place] [address]    GALE Jackson CN

## 2019-08-19 NOTE — PROGRESS NOTES
Return Visit for Liver Transplant, Counseling for Transition from Pediatric to Adult Care    Chief Complaint:  Chief Complaint   Patient presents with     RECHECK     Transplant follow up.       HPI:    I had the pleasure of seeing Jaclyn Diaz in the Pediatric Transplant Clinic today for follow-up of Liver Transplant and counseling for transition to adult care. Jaclyn is a 23 year old female who attends clinic independantly. She has had no illness or hospitalizations since I last saw her in clinic 07/15/2019    Hepatology History:   Secondary Biliary Cirrhosis: Choledochal Cyst  Jaclyn received a  donor whole liver transplant on 2008.   No history of biliary strictures, infections-EBV or CMV, no history of rejection.    Interval History  Jaclyn is signed up for my chart, she is using the dylon on her phone.  Jaclyn knows doses and purpose of medications. Currently she is completing refills and uses [facility] pharmacy.   Compliance with labs is improving.  Labs are drawn at [facility]/or the [facility]. Jaclyn is due for labs but took her tacrolimus today so will plan to get labs . Tacrolimus was increased after  level came back <3.  Jaclyn continues to work at [employer], insurance is active.  She has an Implanon that needs to be replaced.  Jaclyn's anxiety is under control.    Review of Systems:  A comprehensive review of systems was performed and found to be negative other than noted in the HPI.    Allergies:  Jaclyn is allergic to erythromycin; ibuprofen; penicillins; and vancomycin..    Active Medications:  Current Outpatient Medications   Medication Sig Dispense Refill     CITALOPRAM HYDROBROMIDE PO Take 40 mg by mouth daily       PROGRAF (BRAND) 0.5 MG capsule Take 4 capsules (2 mg) in AM and 4 capsules (2 mg) in PM Dose increase. 240 capsule 11        Immunizations:  Immunization History   Administered Date(s) Administered     DTAP (<7y)  08/22/2001     HEPA 10/06/2008, 11/19/2012     HPV 11/05/2008     HPV Quadrivalent 09/27/2017     HepB 01/03/1997, 03/03/1997, 02/09/1998, 10/06/2008     Influenza (H1N1) 11/03/2009     Influenza (IIV3) PF 01/08/2008, 10/06/2008, 09/17/2009, 09/23/2010, 10/20/2011, 11/19/2012, 10/31/2014     Influenza Vaccine IM 3yrs+ 4 Valent IIV4 11/03/2015, 09/27/2017     MMR 02/09/1998, 08/22/2001     Mantoux Tuberculin Skin Test 11/05/2008     Meningococcal (Bexsero ) 07/15/2019     Meningococcal (Menactra ) 11/05/2008, 07/15/2019     OPV, trivalent, live 01/03/1997, 03/03/1997, 05/01/1997     Pneumo Conj 13-V (2010&after) 11/19/2012, 11/03/2015     Pneumococcal 23 valent 11/05/2008     Poliovirus, inactivated (IPV) 08/22/2001     TRIHIBIT (DTAP/HIB, <7y) 01/03/1997, 03/03/1997, 05/01/1997, 02/09/1998     Tdap (Adacel,Boostrix) 01/08/2008     Varicella 08/02/1999, 10/06/2008        PMHx:  Past Medical History:   Diagnosis Date     Anxiety as acute reaction to exceptional (gross) stress 8/08-11/08    due to liver failure/fear of death     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            Infection History     Liver Transplant - 11/8/2008  (#1)     No infections noted for this transplant.            Problems     Liver Transplant - 11/8/2008  (#1)     None noted for this transplant.          Non-Transplant Related Problems       Problem Resolved    9/26/2013 Depressed     2/11/2013 Self-injurious behavior     2/11/2013 Eating disorder     1/4/2012 Health Care Home     11/28/2011 Major depressive disorder, recurrent episode,  "moderate (H)     2010 Major depression in complete remission (H) 2011 Sibling relationship problem 2010    12/10/2009 Status post liver transplantation (H)     12/10/2009 Moderate major depression (H) 2010    10/11/2004 Asthma 2009              PSHx:    Past Surgical History:   Procedure Laterality Date     C CORRECT MALROTATION OF BOWEL  08     C TRANSPLANT LIVER,HETERTOPIC  08    Orthotopic  donor     LAPAROTOMY EXPLORATORY CHILD  2012    Procedure:LAPAROTOMY EXPLORATORY CHILD; Exploratory laparotomy, lysis of adhesions. ; Surgeon:SIXTO FLOREZ; Location:[facility] OR       FHx:  History reviewed. No pertinent family history.    SHx:  Social History     Tobacco Use     Smoking status: Never Smoker     Smokeless tobacco: Never Used   Substance Use Topics     Alcohol use: None     Drug use: None     Social History     Social History Narrative    Lives with boyfriend and works full time at [employer].       Physical Exam:    /86 (BP Location: Right arm, Patient Position: Sitting, Cuff Size: Adult Regular)   Pulse 78   Ht 1.733 m (5' 8.23\")   Wt 82 kg (180 lb 12.4 oz)   BMI 27.30 kg/m    Blood pressure percentiles are not available for patients who are 18 years or older.    Exam:  Constitutional: healthy, alert and no distress  Head: Normocephalic. No masses, lesions, tenderness or abnormalities  Neck: Neck supple. No adenopathy. Thyroid symmetric, normal size,, Carotids without bruits.  EYE: GERALD, EOMI, fundi normal, corneas normal, no foreign bodies, no periorbital cellulitis  ENT: ENT exam normal, no neck nodes or sinus tenderness  Cardiovascular: negative, PMI normal. No lifts, heaves, or thrills. RRR. No murmurs, clicks gallops or rub  Respiratory: negative, Percussion normal. Good diaphragmatic excursion. Lungs clear  Gastrointestinal: Abdomen soft, non-tender. BS normal. No masses, organomegaly, or hepatosplenomegaly.  : " Deferred  Musculoskeletal: extremities normal- no gross deformities noted, gait normal and normal muscle tone  Skin: no suspicious lesions or rashes  Neurologic: Gait normal. Reflexes normal and symmetric. Sensation grossly WNL.  Psychiatric: mentation appears normal and affect normal/bright  Hematologic/Lymphatic/Immunologic: normal ant/post cervical, axillary, supraclavicular and inguinal nodes    Labs and Imaging:  Results for orders placed or performed in visit on 07/15/19   Tacrolimus level   Result Value Ref Range    Tacrolimus Last Dose 7/14 2300     Tacrolimus Level <3.0 (L) 5.0 - 15.0 ug/L   Lipid panel reflex to direct LDL Fasting   Result Value Ref Range    Cholesterol 101 <200 mg/dL    Triglycerides 64 <150 mg/dL    HDL Cholesterol 51 >49 mg/dL    LDL Cholesterol Calculated 37 <100 mg/dL    Non HDL Cholesterol 50 <130 mg/dL   EBV DNA PCR Quantitative Whole Blood   Result Value Ref Range    EBV DNA Copies/mL EBV DNA Not Detected EBVNEG^EBV DNA Not Detected [Copies]/mL    EBV DNA Log of Copies Not Calculated <2.7 [Log_copies]/mL   Phosphorus   Result Value Ref Range    Phosphorus 2.6 2.5 - 4.5 mg/dL   Magnesium   Result Value Ref Range    Magnesium 2.1 1.6 - 2.3 mg/dL   GGT   Result Value Ref Range    GGT 7 0 - 40 U/L   Hepatic panel   Result Value Ref Range    Bilirubin Direct 0.2 0.0 - 0.2 mg/dL    Bilirubin Total 0.7 0.2 - 1.3 mg/dL    Albumin 3.7 3.4 - 5.0 g/dL    Protein Total 6.5 (L) 6.8 - 8.8 g/dL    Alkaline Phosphatase 94 40 - 150 U/L    ALT 28 0 - 50 U/L    AST 16 0 - 45 U/L   Basic metabolic panel   Result Value Ref Range    Sodium 139 133 - 144 mmol/L    Potassium 4.9 3.4 - 5.3 mmol/L    Chloride 109 94 - 109 mmol/L    Carbon Dioxide 26 20 - 32 mmol/L    Anion Gap 4 3 - 14 mmol/L    Glucose 89 70 - 99 mg/dL    Urea Nitrogen 16 7 - 30 mg/dL    Creatinine 0.74 0.52 - 1.04 mg/dL    GFR Estimate >90 >60 mL/min/[1.73_m2]    GFR Estimate If Black >90 >60 mL/min/[1.73_m2]    Calcium 8.6 8.5 - 10.1  mg/dL   CBC with platelets differential   Result Value Ref Range    WBC 5.1 4.0 - 11.0 10e9/L    RBC Count 4.72 3.8 - 5.2 10e12/L    Hemoglobin 15.1 11.7 - 15.7 g/dL    Hematocrit 42.5 35.0 - 47.0 %    MCV 90 78 - 100 fl    MCH 32.0 26.5 - 33.0 pg    MCHC 35.5 31.5 - 36.5 g/dL    RDW 11.4 10.0 - 15.0 %    Platelet Count 160 150 - 450 10e9/L    Diff Method Automated Method     % Neutrophils 64.2 %    % Lymphocytes 24.2 %    % Monocytes 7.5 %    % Eosinophils 3.7 %    % Basophils 0.2 %    % Immature Granulocytes 0.2 %    Nucleated RBCs 0 0 /100    Absolute Neutrophil 3.3 1.6 - 8.3 10e9/L    Absolute Lymphocytes 1.2 0.8 - 5.3 10e9/L    Absolute Monocytes 0.4 0.0 - 1.3 10e9/L    Absolute Eosinophils 0.2 0.0 - 0.7 10e9/L    Absolute Basophils 0.0 0.0 - 0.2 10e9/L    Abs Immature Granulocytes 0.0 0 - 0.4 10e9/L    Absolute Nucleated RBC 0.0        I personally reviewed results of laboratory evaluation, imaging studies and past medical records that were available during this outpatient visit.      Assessment and Plan:    No diagnosis found.      Serology Results     Recipient (Pre-transplant Results)     Anti-HBcAb   HBC Total:  Negative     HBsAg   HBsAg:  Negative     HBsAb   HBsAb:  .2            HBV DNA    No results on file    Anti-HCV   HCV Ab:  Negative    HCV RNA:  <25     Anti-HIV I/II   HIV Ab:  Negative            Anti-CMV   CMV Ig    CMV IgM:  <0.90     Anti-HTLV I/II   No results on file    RPR/VDRL   No results on file           EBV IgG   No results on file    EBV IgM   No results on file    EBNA   No results on file                      Liver Donor     Anti-HBcAb   HBC Total:  Negative    HBsAg   HBsAg:  Negative    HBsAb   No results on file           HBV DNA    No results on file    Anti-HCV   HCV:  Negative    Anti-HIV I/II   HIV-1:  Negative           Anti-CMV   CMV IgG:  Positive   CMV Nucleic Acid:  Positive    Anti-HTLV I/II   HTLV:  Negative    RPR/VDRL   RPR:  Negative           EBV IgG   EBV  VCA IgG:  Negative    EBV IgM   EBV VCA IgM:  Negative    EBNA   No results on file                     Immunosuppression: Tacrolimus goal 4-6 Tacrolimus dose to 2 mg every 12 hours. Monthly labs.    Immunoprophylaxis:  PCP prophylaxis: No  Antiviral prophylaxis: No  Antifungal prophylaxis: No    Rejection: No history. One biopsy 4/3/2009 No evidence of rejection.    Infection: No concerns    Follow up with PCP on Anxiety, exchange Implanon.    Transition: Jaclyn is ready to transition to adult after she demonstrates compliance with labs, and clinic visits.  We hope to transition to Adult Hepatology fall of 2019 after her appointment with Dr. Mauro. The following topics were discussed: My Chart, medications, labs, primary and speciality care, and general health follow up.      Explained the differences in pediatric care and adult care, primary care provider will be needed for general health maintanance and to refill non transplant medications. For your first adult visit have questions ready and be prepared to answer questions regarding your health and habits, the provider will want to take to you not your parents.      Plan:  Continue to work toward [place] with:    Setting up appointments    Communication with your health care team    Compliance with monthly labs     Jaclyn will need the follow adult providers:    Primary care physician- Dr. Rut Cuevas [facility]    GYN/Women's Health- PCP manages for Jaclyn    GI: With Transition to Adult Hepatology [facility]- prefers female, plan to transition to Dr. Emily Mcclure    Dermatology- for yearly skin checks due to increased risk for skin cancer due to immunosuppression medications    Opthomology- Yearly    Dental- At least Annually     Immunizations: Due for TDap. Yearly flu shot each fall recommended.     Patient Education: Today counseling/educating provided the patient regarding transition to adult care, medications, lab interpertations, the  importance of knowing medical history, differences of Adult and Pediatric care, patient s transplant status, patients symptoms, physical exam and evaluation results findings, tentative diagnosis as well as the treatment plan (Including but not limited to possible side effects and complications related to the disease, treatment modalities and intervention(s).     Jaclyn expressed understanding and consent. Jaclyn was receptive and ready to learn; no apparent learning barriers were identified.      Monthly labs are recommended for Jaclyn to monitor Liver function and immunosuppression drug levels.    Health Maintenance: Live vaccines are contraindicated due to immunosuppression.    Jaclyn must have all other vaccines updated in a timely fashion including an annual influenza vaccine.  Jaclyn must be seen by the dentist annually. We recommend using sunscreen SPF 30 or higher due to increased risk of skin cancer.  Over the counter medications should be checked prior to use to ensure they are safe in patients with liver transplant.    Follow up: Return in about 4 weeks (around 9/16/2019). Monthly Liver Transplant labs. Please return sooner should Jaclyn become symptomatic. For any questions or concerns, feel free to contact the transplant coordinators   at [phone].    Sincerely,    GALE Jackson CNP   Pediatric Solid Organ Transplant    CC:   Patient Care Team:  Jessica German as PCP - General (Family Practice)  Stephanie Mauro MD as MD (Pediatrics)  Adam Lopez CMA Moe, Tracy A, APRN CNP as Nurse Practitioner (Nurse Practitioner - Pediatrics)  JESSICA GERMAN    Copy to patient  Choco Espinoza Gary   [address] [address]

## 2019-08-19 NOTE — NURSING NOTE
"[facility] [[id number]]  Chief Complaint   Patient presents with     RECHECK     Transplant follow up.     Initial /86 (BP Location: Right arm, Patient Position: Sitting, Cuff Size: Adult Regular)   Pulse 78   Ht 5' 8.23\" (173.3 cm)   Wt 180 lb 12.4 oz (82 kg)   BMI 27.30 kg/m   Estimated body mass index is 27.3 kg/m  as calculated from the following:    Height as of this encounter: 5' 8.23\" (173.3 cm).    Weight as of this encounter: 180 lb 12.4 oz (82 kg).  Medication Reconciliation: complete  "

## 2019-09-04 ENCOUNTER — ALLIED HEALTH/NURSE VISIT (OUTPATIENT)
Dept: NURSING | Facility: CLINIC | Age: 24
End: 2019-09-04
Payer: COMMERCIAL

## 2019-09-04 DIAGNOSIS — Z94.4 STATUS POST LIVER TRANSPLANTATION (H): ICD-10-CM

## 2019-09-04 DIAGNOSIS — Z94.4 LIVER REPLACED BY TRANSPLANT (H): ICD-10-CM

## 2019-09-04 DIAGNOSIS — Z23 NEED FOR VACCINATION: ICD-10-CM

## 2019-09-04 LAB
ALBUMIN SERPL-MCNC: 3.7 G/DL (ref 3.4–5)
ALP SERPL-CCNC: 97 U/L (ref 40–150)
ALT SERPL W P-5'-P-CCNC: 26 U/L (ref 0–50)
ANION GAP SERPL CALCULATED.3IONS-SCNC: 7 MMOL/L (ref 3–14)
AST SERPL W P-5'-P-CCNC: 15 U/L (ref 0–45)
BASOPHILS # BLD AUTO: 0 10E9/L (ref 0–0.2)
BASOPHILS NFR BLD AUTO: 0.4 %
BILIRUB DIRECT SERPL-MCNC: 0.2 MG/DL (ref 0–0.2)
BILIRUB SERPL-MCNC: 0.7 MG/DL (ref 0.2–1.3)
BUN SERPL-MCNC: 11 MG/DL (ref 7–30)
CALCIUM SERPL-MCNC: 8.2 MG/DL (ref 8.5–10.1)
CHLORIDE SERPL-SCNC: 107 MMOL/L (ref 94–109)
CO2 SERPL-SCNC: 25 MMOL/L (ref 20–32)
CREAT SERPL-MCNC: 0.73 MG/DL (ref 0.52–1.04)
DIFFERENTIAL METHOD BLD: ABNORMAL
EOSINOPHIL # BLD AUTO: 0.1 10E9/L (ref 0–0.7)
EOSINOPHIL NFR BLD AUTO: 2.2 %
ERYTHROCYTE [DISTWIDTH] IN BLOOD BY AUTOMATED COUNT: 11.5 % (ref 10–15)
GFR SERPL CREATININE-BSD FRML MDRD: >90 ML/MIN/{1.73_M2}
GGT SERPL-CCNC: 10 U/L (ref 0–40)
GLUCOSE SERPL-MCNC: 97 MG/DL (ref 70–99)
HCT VFR BLD AUTO: 44.4 % (ref 35–47)
HGB BLD-MCNC: 15.3 G/DL (ref 11.7–15.7)
IMM GRANULOCYTES # BLD: 0 10E9/L (ref 0–0.4)
IMM GRANULOCYTES NFR BLD: 0.2 %
LYMPHOCYTES # BLD AUTO: 1.3 10E9/L (ref 0.8–5.3)
LYMPHOCYTES NFR BLD AUTO: 25.3 %
MAGNESIUM SERPL-MCNC: 1.9 MG/DL (ref 1.6–2.3)
MCH RBC QN AUTO: 31.3 PG (ref 26.5–33)
MCHC RBC AUTO-ENTMCNC: 34.5 G/DL (ref 31.5–36.5)
MCV RBC AUTO: 91 FL (ref 78–100)
MONOCYTES # BLD AUTO: 0.3 10E9/L (ref 0–1.3)
MONOCYTES NFR BLD AUTO: 5.6 %
NEUTROPHILS # BLD AUTO: 3.3 10E9/L (ref 1.6–8.3)
NEUTROPHILS NFR BLD AUTO: 66.3 %
NRBC # BLD AUTO: 0 10*3/UL
NRBC BLD AUTO-RTO: 0 /100
PHOSPHATE SERPL-MCNC: 2.9 MG/DL (ref 2.5–4.5)
PLATELET # BLD AUTO: 140 10E9/L (ref 150–450)
POTASSIUM SERPL-SCNC: 4.1 MMOL/L (ref 3.4–5.3)
PROT SERPL-MCNC: 6.4 G/DL (ref 6.8–8.8)
RBC # BLD AUTO: 4.89 10E12/L (ref 3.8–5.2)
SODIUM SERPL-SCNC: 139 MMOL/L (ref 133–144)
TACROLIMUS BLD-MCNC: 4.1 UG/L (ref 5–15)
TME LAST DOSE: ABNORMAL H
WBC # BLD AUTO: 5 10E9/L (ref 4–11)

## 2019-09-04 PROCEDURE — 85025 COMPLETE CBC W/AUTO DIFF WBC: CPT | Performed by: PEDIATRICS

## 2019-09-04 PROCEDURE — 82977 ASSAY OF GGT: CPT | Performed by: PEDIATRICS

## 2019-09-04 PROCEDURE — 83735 ASSAY OF MAGNESIUM: CPT | Performed by: PEDIATRICS

## 2019-09-04 PROCEDURE — 80048 BASIC METABOLIC PNL TOTAL CA: CPT | Performed by: PEDIATRICS

## 2019-09-04 PROCEDURE — 80197 ASSAY OF TACROLIMUS: CPT | Performed by: PEDIATRICS

## 2019-09-04 PROCEDURE — 80076 HEPATIC FUNCTION PANEL: CPT | Performed by: PEDIATRICS

## 2019-09-04 PROCEDURE — 87799 DETECT AGENT NOS DNA QUANT: CPT | Performed by: PEDIATRICS

## 2019-09-04 PROCEDURE — 84100 ASSAY OF PHOSPHORUS: CPT | Performed by: PEDIATRICS

## 2019-09-04 PROCEDURE — 36415 COLL VENOUS BLD VENIPUNCTURE: CPT | Performed by: PEDIATRICS

## 2019-09-05 LAB
EBV DNA # SPEC NAA+PROBE: NORMAL {COPIES}/ML
EBV DNA SPEC NAA+PROBE-LOG#: NORMAL {LOG_COPIES}/ML

## 2019-09-16 ENCOUNTER — OFFICE VISIT (OUTPATIENT)
Dept: GASTROENTEROLOGY | Facility: CLINIC | Age: 24
End: 2019-09-16
Attending: PEDIATRICS
Payer: COMMERCIAL

## 2019-09-16 VITALS
SYSTOLIC BLOOD PRESSURE: 127 MMHG | HEIGHT: 68 IN | WEIGHT: 182.1 LBS | DIASTOLIC BLOOD PRESSURE: 88 MMHG | HEART RATE: 77 BPM | BODY MASS INDEX: 27.6 KG/M2

## 2019-09-16 DIAGNOSIS — Z94.4 STATUS POST LIVER TRANSPLANTATION (H): Primary | ICD-10-CM

## 2019-09-16 PROCEDURE — G0463 HOSPITAL OUTPT CLINIC VISIT: HCPCS | Mod: 25

## 2019-09-16 PROCEDURE — 90686 IIV4 VACC NO PRSV 0.5 ML IM: CPT | Mod: ZF

## 2019-09-16 PROCEDURE — G0008 ADMIN INFLUENZA VIRUS VAC: HCPCS

## 2019-09-16 PROCEDURE — 25000128 H RX IP 250 OP 636: Mod: ZF

## 2019-09-16 ASSESSMENT — PAIN SCALES - GENERAL: PAINLEVEL: NO PAIN (1)

## 2019-09-16 ASSESSMENT — MIFFLIN-ST. JEOR: SCORE: 1629.37

## 2019-09-16 NOTE — PROGRESS NOTES
"Jennifer Gonzales  [facility] [address] [address]     Dear Dr. Gonzales:    Your patient, Jaclyn Diaz, was seen in Pediatric Gastroenterology Clinic at the [facility].  Jaclyn is an 23 year old female s/p liver transplantation who returns for a last transitional visit. Since the last evaluation, Jaclyn has had no abdominal pain, constipation or diarrhea. She uses sunscreen fairly well.    She has not been checking her liver labs regularly.  Her liver enzymes are normal.    She is now working full-time and has insurance.     Current Outpatient Medications   Medication Sig Dispense Refill     CITALOPRAM HYDROBROMIDE PO Take 40 mg by mouth daily       PROGRAF (BRAND) 0.5 MG capsule Take 4 capsules (2 mg) in AM and 4 capsules (2 mg) in PM Dose increase. 240 capsule 11       Interim History:  Emergency Room visits: No  Hospitalizations: No  Surgeries: No  School status:not in school  Family Changes: none    ROS:  The Review of Systems is negative other than noted in the HPI. She is on birth control (Norplant) and uses condoms.    PE:  On physical examination, she is alert, active and in no acute distress.  /88   Pulse 77   Ht 1.719 m (5' 7.68\")   Wt 82.6 kg (182 lb 1.6 oz)   BMI 27.95 kg/m    Normalized weight-for-age data not available for patients older than 20 years., Normalized stature-for-age data not available for patients older than 20 years.  Body mass index is 27.95 kg/m . Normalized BMI data available only for age 0 to 20 years..  The pupils are equal, round and reactive to light with full extraocular movements.  No enlarged tonsils. There is no lymphadenopathy (cervical, supraclavicular, axillary).    The abdomen is soft and nontender with no masses or organomegaly.The scars are well healed.  The extremities reveal no clubbing or edema and have full range of motion.  The skin reveals no unusual rashes or lesions.       Assessment and Plan:    " ICD-10-CM    1. Status post liver transplantation (H) Z94.4         Jaclyn needs every other month liver labs to monitor for rejection, which may occur late and cause loss of the transplant.      We will have SAM see her today to deal with some of her financial issues around medical care.    We will transition Jaclyn to the Adult Hepatology group.    After liver transplant, please keep the following healthcare issues in mind    1.Immunizations should be kept up to date, including a yearly flu shot. No liver virus vaccines should be given to children after liver transplant (no varicella, measles/mumps/rubella, Flumist).    2. Minor aches and pains should be treated with appropriate doses of acetaminophen. Children who have had transplantation should NOT receive non-steroidal anti-inflammatory agents (Advil, ibuprofen, etc) as this will increase the risk of kidney disease.    3. Sunscreen should be used daily, and reapplied as necessary, to reduce the risk of skin cancer.    4. When immunosuppression is weaned to maintenance, the child should be seen every 6 months by a dentist. Your transplant coordinator can provide antibiotic prophylaxis as needed.    5. We encourage daily activity and a healthy diet to reduce the risk of obesity and diabetes, both of which are long-term risks of transplantation.  No follow-ups on file.       Thank you for allowing me to participate in Jaclyn's care. If you have any questions, please contact the transplant office at [phone] and ask for your transplant coordinator or contact your coordinator directly.  If you have scheduling needs, please call the Call Center at [phone].  If you are waiting on stool tests or outside results and do not hear from us after two weeks of testing, please contact us.  Outside results should be faxed to [phone].    Sincerely    Stephanie Mauro MD  Professor of Pediatrics  Director, Pediatric Gastroenterology, Hepatology and  Nutrition  [facility]    CC  Patient Care Team:  Jessica German as PCP - General (Family Practice)  Stephanie Mauro MD as MD (Pediatrics)  Adam Lopez CMA Moe, Tracy A, APRN CNP as Nurse Practitioner (Nurse Practitioner - Pediatrics)  JESSICA GERMAN    Copy to patient  Choco Espinoza Gary   [address] [address]

## 2019-09-16 NOTE — NURSING NOTE
"[facility] [[id number]]  Chief Complaint   Patient presents with     RECHECK     Tx follow up     Initial /88   Pulse 77   Ht 5' 7.68\" (171.9 cm)   Wt 182 lb 1.6 oz (82.6 kg)   BMI 27.95 kg/m   Estimated body mass index is 27.95 kg/m  as calculated from the following:    Height as of this encounter: 5' 7.68\" (171.9 cm).    Weight as of this encounter: 182 lb 1.6 oz (82.6 kg).  Medication Reconciliation: complete Griselda Aleman LPN    "

## 2019-09-16 NOTE — LETTER
"  9/16/2019      RE: Jaclyn Diaz  [address] [address]       Jennifer Gonzales  [facility] [address] [address]     Dear Dr. Gonzales:    Your patient, Jaclyn Diaz, was seen in Pediatric Gastroenterology Clinic at the [facility].  Jaclyn is an 23 year old female s/p liver transplantation who returns for a last transitional visit. Since the last evaluation, Jaclyn has had no abdominal pain, constipation or diarrhea. She uses sunscreen fairly well.    She has not been checking her liver labs regularly.  Her liver enzymes are normal.    She is now working full-time and has insurance.     Current Outpatient Medications   Medication Sig Dispense Refill     CITALOPRAM HYDROBROMIDE PO Take 40 mg by mouth daily       PROGRAF (BRAND) 0.5 MG capsule Take 4 capsules (2 mg) in AM and 4 capsules (2 mg) in PM Dose increase. 240 capsule 11       Interim History:  Emergency Room visits: No  Hospitalizations: No  Surgeries: No  School status:not in school  Family Changes: none    ROS:  The Review of Systems is negative other than noted in the HPI. She is on birth control (Norplant) and uses condoms.    PE:  On physical examination, she is alert, active and in no acute distress.  /88   Pulse 77   Ht 1.719 m (5' 7.68\")   Wt 82.6 kg (182 lb 1.6 oz)   BMI 27.95 kg/m     Normalized weight-for-age data not available for patients older than 20 years., Normalized stature-for-age data not available for patients older than 20 years.  Body mass index is 27.95 kg/m . Normalized BMI data available only for age 0 to 20 years..  The pupils are equal, round and reactive to light with full extraocular movements.  No enlarged tonsils. There is no lymphadenopathy (cervical, supraclavicular, axillary).    The abdomen is soft and nontender with no masses or organomegaly.The scars are well healed.  The extremities reveal no clubbing or edema and have full " range of motion.  The skin reveals no unusual rashes or lesions.       Assessment and Plan:    ICD-10-CM    1. Status post liver transplantation (H) Z94.4         Jaclyn needs every other month liver labs to monitor for rejection, which may occur late and cause loss of the transplant.      We will have MSW see her today to deal with some of her financial issues around medical care.    We will transition Jaclyn to the Adult Hepatology group.    After liver transplant, please keep the following healthcare issues in mind    1.Immunizations should be kept up to date, including a yearly flu shot. No liver virus vaccines should be given to children after liver transplant (no varicella, measles/mumps/rubella, Flumist).    2. Minor aches and pains should be treated with appropriate doses of acetaminophen. Children who have had transplantation should NOT receive non-steroidal anti-inflammatory agents (Advil, ibuprofen, etc) as this will increase the risk of kidney disease.    3. Sunscreen should be used daily, and reapplied as necessary, to reduce the risk of skin cancer.    4. When immunosuppression is weaned to maintenance, the child should be seen every 6 months by a dentist. Your transplant coordinator can provide antibiotic prophylaxis as needed.    5. We encourage daily activity and a healthy diet to reduce the risk of obesity and diabetes, both of which are long-term risks of transplantation.  No follow-ups on file.       Thank you for allowing me to participate in Jaclyn's care. If you have any questions, please contact the transplant office at [phone] and ask for your transplant coordinator or contact your coordinator directly.  If you have scheduling needs, please call the Call Center at [phone].  If you are waiting on stool tests or outside results and do not hear from us after two weeks of testing, please contact us.  Outside results should be faxed to [phone].    Sincerely    Stephanie Templeton  MD Zunilda  Professor of Pediatrics  Director, Pediatric Gastroenterology, Hepatology and Nutrition  [facility]    CC  Patient Care Team:  Jessica Gomez as PCP - General (Family Practice)  Stephanie Mauro MD as MD (Pediatrics)  Adam Lopez, Florina Raymond, GALE GARG as Nurse Practitioner (Nurse Practitioner - Pediatrics)    Copy to patient  Parent(s) of Jaclyn Joe  [address] [address]

## 2019-10-11 DIAGNOSIS — Z94.4 LIVER REPLACED BY TRANSPLANT (H): ICD-10-CM

## 2019-10-11 RX ORDER — TACROLIMUS 0.5 MG/1
CAPSULE, GELATIN COATED ORAL
Qty: 240 CAPSULE | Refills: 11 | Status: SHIPPED | OUTPATIENT
Start: 2019-10-11 | End: 2020-02-24

## 2019-10-15 ENCOUNTER — TELEPHONE (OUTPATIENT)
Dept: TRANSPLANT | Facility: CLINIC | Age: 24
End: 2019-10-15

## 2019-10-15 NOTE — TELEPHONE ENCOUNTER
Hello,    Patient prescription insurance coverage for Prograf is Primary Insurance: [facility], then Coordination Of Benefits Secondary Copay Card: [employer] Prograf Copay Card.    Per [facility], confirmed that [employer] [facility] insurance plan limits the number of pharmacies they can contract with. [facility] does have a contract, however, [facility] does not have a contract. [facility] department confirmed with the insurance plan that they will not add any more pharmacies to their network either.    Due to this, patient can still get their medications from the [facility], and be covered (as  medication order), but there is no way to get the same coverage for [facility] (for medication delivery).    Patient mother has been informed of this information. Patient mother has also been informed that if they prefer deliveries of medications, they may fill their Prograf at the preferred in-network specialty pharmacy: [facility] (Phone: [phone] Fax:[phone]). Or patient may try filling at a local retail pharmacy that is in-network and closer to patient while they are away at [facility].    In the meantime, patient mother has opted to continue filling at [facility].    Thank you.    Lynette Devi  Patient   [facility]   [address] [place] [id number]   [email or web address]  [email or web address]  Direct Line: [phone]  Direct Fax:[phone]

## 2019-12-23 DIAGNOSIS — Z94.4 STATUS POST LIVER TRANSPLANTATION (H): ICD-10-CM

## 2019-12-23 DIAGNOSIS — Z94.4 LIVER REPLACED BY TRANSPLANT (H): ICD-10-CM

## 2019-12-23 LAB
ALBUMIN SERPL-MCNC: 3.4 G/DL (ref 3.4–5)
ALP SERPL-CCNC: 94 U/L (ref 40–150)
ALT SERPL W P-5'-P-CCNC: 29 U/L (ref 0–50)
ANION GAP SERPL CALCULATED.3IONS-SCNC: 6 MMOL/L (ref 3–14)
AST SERPL W P-5'-P-CCNC: 19 U/L (ref 0–45)
BASOPHILS # BLD AUTO: 0 10E9/L (ref 0–0.2)
BASOPHILS NFR BLD AUTO: 0.3 %
BILIRUB DIRECT SERPL-MCNC: 0.1 MG/DL (ref 0–0.2)
BILIRUB SERPL-MCNC: 0.4 MG/DL (ref 0.2–1.3)
BUN SERPL-MCNC: 14 MG/DL (ref 7–30)
CALCIUM SERPL-MCNC: 7.9 MG/DL (ref 8.5–10.1)
CHLORIDE SERPL-SCNC: 110 MMOL/L (ref 94–109)
CO2 SERPL-SCNC: 24 MMOL/L (ref 20–32)
CREAT SERPL-MCNC: 0.6 MG/DL (ref 0.52–1.04)
DIFFERENTIAL METHOD BLD: ABNORMAL
EOSINOPHIL # BLD AUTO: 0.3 10E9/L (ref 0–0.7)
EOSINOPHIL NFR BLD AUTO: 4.4 %
ERYTHROCYTE [DISTWIDTH] IN BLOOD BY AUTOMATED COUNT: 11.6 % (ref 10–15)
GFR SERPL CREATININE-BSD FRML MDRD: >90 ML/MIN/{1.73_M2}
GGT SERPL-CCNC: 11 U/L (ref 0–40)
GLUCOSE SERPL-MCNC: 95 MG/DL (ref 70–99)
HCT VFR BLD AUTO: 43.5 % (ref 35–47)
HGB BLD-MCNC: 15.2 G/DL (ref 11.7–15.7)
IMM GRANULOCYTES # BLD: 0 10E9/L (ref 0–0.4)
IMM GRANULOCYTES NFR BLD: 0 %
LYMPHOCYTES # BLD AUTO: 1.4 10E9/L (ref 0.8–5.3)
LYMPHOCYTES NFR BLD AUTO: 23.6 %
MAGNESIUM SERPL-MCNC: 1.8 MG/DL (ref 1.6–2.3)
MCH RBC QN AUTO: 32.3 PG (ref 26.5–33)
MCHC RBC AUTO-ENTMCNC: 34.9 G/DL (ref 31.5–36.5)
MCV RBC AUTO: 93 FL (ref 78–100)
MONOCYTES # BLD AUTO: 0.4 10E9/L (ref 0–1.3)
MONOCYTES NFR BLD AUTO: 6.9 %
NEUTROPHILS # BLD AUTO: 4 10E9/L (ref 1.6–8.3)
NEUTROPHILS NFR BLD AUTO: 64.8 %
NRBC # BLD AUTO: 0 10*3/UL
NRBC BLD AUTO-RTO: 0 /100
PHOSPHATE SERPL-MCNC: 3.7 MG/DL (ref 2.5–4.5)
PLATELET # BLD AUTO: 140 10E9/L (ref 150–450)
POTASSIUM SERPL-SCNC: 4 MMOL/L (ref 3.4–5.3)
PROT SERPL-MCNC: 6.1 G/DL (ref 6.8–8.8)
RBC # BLD AUTO: 4.7 10E12/L (ref 3.8–5.2)
SODIUM SERPL-SCNC: 140 MMOL/L (ref 133–144)
TACROLIMUS BLD-MCNC: 3.7 UG/L (ref 5–15)
TME LAST DOSE: ABNORMAL H
WBC # BLD AUTO: 6.1 10E9/L (ref 4–11)

## 2019-12-23 PROCEDURE — 85025 COMPLETE CBC W/AUTO DIFF WBC: CPT | Performed by: PEDIATRICS

## 2019-12-23 PROCEDURE — 36415 COLL VENOUS BLD VENIPUNCTURE: CPT | Performed by: PEDIATRICS

## 2019-12-23 PROCEDURE — 87799 DETECT AGENT NOS DNA QUANT: CPT | Performed by: PEDIATRICS

## 2019-12-23 PROCEDURE — 80076 HEPATIC FUNCTION PANEL: CPT | Performed by: PEDIATRICS

## 2019-12-23 PROCEDURE — 80048 BASIC METABOLIC PNL TOTAL CA: CPT | Performed by: PEDIATRICS

## 2019-12-23 PROCEDURE — 82977 ASSAY OF GGT: CPT | Performed by: PEDIATRICS

## 2019-12-23 PROCEDURE — 80197 ASSAY OF TACROLIMUS: CPT | Performed by: PEDIATRICS

## 2019-12-23 PROCEDURE — 84100 ASSAY OF PHOSPHORUS: CPT | Performed by: PEDIATRICS

## 2019-12-23 PROCEDURE — 83735 ASSAY OF MAGNESIUM: CPT | Performed by: PEDIATRICS

## 2019-12-24 LAB
EBV DNA # SPEC NAA+PROBE: NORMAL {COPIES}/ML
EBV DNA SPEC NAA+PROBE-LOG#: NORMAL {LOG_COPIES}/ML

## 2020-01-13 ENCOUNTER — TELEPHONE (OUTPATIENT)
Dept: TRANSPLANT | Facility: CLINIC | Age: 25
End: 2020-01-13

## 2020-01-13 NOTE — TELEPHONE ENCOUNTER
Patient Call: Voicemail  Date/Time: 1/12/2020 4403  Reason for call: Pt LM to schedule her Peds-Adult  Appointment  Has Mondays off  Wonders if the 20 th is available

## 2020-01-24 ENCOUNTER — TELEPHONE (OUTPATIENT)
Dept: TRANSPLANT | Facility: CLINIC | Age: 25
End: 2020-01-24

## 2020-01-27 ENCOUNTER — TELEPHONE (OUTPATIENT)
Dept: TRANSPLANT | Facility: CLINIC | Age: 25
End: 2020-01-27

## 2020-02-06 ENCOUNTER — TELEPHONE (OUTPATIENT)
Dept: TRANSPLANT | Facility: CLINIC | Age: 25
End: 2020-02-06

## 2020-02-06 NOTE — TELEPHONE ENCOUNTER
Jaclyn will be transitioning to the adult post transplant team.  She has an appt w/ Dr. Velásquez on 2/10.    I called the phone number listed for her, her mom answered, informed me that Jaclyn doesn't live there and mentioned a HIPPAA violation to talk w/her.  She gave me a different phone number and suggested that I reach her on that number.    I called the number Choco (Jacyln's mom) gave me,Jaclyn didn't answer and there is no voice mail available on this number.  I had planned to just confirm that she is aware of the appt next week and let her know that I will be her new transplant coordinator.    I will meet w/ her at her appt next week.

## 2020-02-07 ENCOUNTER — TELEPHONE (OUTPATIENT)
Dept: GASTROENTEROLOGY | Facility: CLINIC | Age: 25
End: 2020-02-07

## 2020-02-10 ENCOUNTER — OFFICE VISIT (OUTPATIENT)
Dept: GASTROENTEROLOGY | Facility: CLINIC | Age: 25
End: 2020-02-10
Attending: INTERNAL MEDICINE
Payer: COMMERCIAL

## 2020-02-10 ENCOUNTER — ALLIED HEALTH/NURSE VISIT (OUTPATIENT)
Dept: TRANSPLANT | Facility: CLINIC | Age: 25
End: 2020-02-10
Attending: INTERNAL MEDICINE
Payer: COMMERCIAL

## 2020-02-10 VITALS
WEIGHT: 182.2 LBS | TEMPERATURE: 97.7 F | DIASTOLIC BLOOD PRESSURE: 76 MMHG | SYSTOLIC BLOOD PRESSURE: 116 MMHG | BODY MASS INDEX: 27.61 KG/M2 | HEIGHT: 68 IN | HEART RATE: 85 BPM | OXYGEN SATURATION: 96 %

## 2020-02-10 DIAGNOSIS — Z94.4 LIVER REPLACED BY TRANSPLANT (H): Primary | ICD-10-CM

## 2020-02-10 DIAGNOSIS — Z94.4 STATUS POST LIVER TRANSPLANTATION (H): ICD-10-CM

## 2020-02-10 DIAGNOSIS — Z13.220 LIPID SCREENING: ICD-10-CM

## 2020-02-10 DIAGNOSIS — Z94.4 LIVER REPLACED BY TRANSPLANT (H): ICD-10-CM

## 2020-02-10 PROCEDURE — G0463 HOSPITAL OUTPT CLINIC VISIT: HCPCS | Mod: ZF

## 2020-02-10 ASSESSMENT — MIFFLIN-ST. JEOR: SCORE: 1629.95

## 2020-02-10 ASSESSMENT — PAIN SCALES - GENERAL: PAINLEVEL: NO PAIN (0)

## 2020-02-10 NOTE — NURSING NOTE
"Chief Complaint   Patient presents with     RECHECK     liver tx     /76   Pulse 85   Temp 97.7  F (36.5  C) (Oral)   Ht 1.727 m (5' 8\")   Wt 82.6 kg (182 lb 3.2 oz)   SpO2 96%   BMI 27.70 kg/m    Merle Younger, [facility]  2/10/2020 3:44 PM    "

## 2020-02-10 NOTE — LETTER
2/10/2020     RE: Jaclyn Diaz  [address] [facility] [address]     Dear Colleague,    Thank you for referring your patient, Jaclyn Diaz, to the [facility] HEPATOLOGY at [facility]. Please see a copy of my visit note below.    HISTORY OF PRESENT ILLNESS:  I had the pleasure of seeing Jaclyn Diaz for consultation in the Liver Transplant Clinic at the [facility] on 02/10/2020.  Ms. Diaz is now being transitioned from the Pediatric Liver Transplant program to the Adult Liver Transplant Program.  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  She just had biliary obstruction with secondary biliary cirrhosis.  Her posttransplant course was fairly unremarkable.  She is on tacrolimus as a single-agent immunosuppression.      She feels well at this visit.  She denies any abdominal pain, itching or skin rash or fatigue.  She works full time as a manager at [employer].  She denies any increased abdominal girth or lower extremity edema.      She denies any fevers or chills, cough or shortness of breath.  She denies any nausea, vomiting, diarrhea or constipation.  Her appetite has been good, and her weight for the most part has been stable.      PAST MEDICAL HISTORY:  Significant for a liver transplant back in 11/2008.  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  She has a history of depression, which seems to be under fairly good control.  She also carries a diagnosis of an eating disorder as well.      SOCIAL HISTORY:  She is a smoker.  She does drink alcohol perhaps every other week but generally does not have more than 2 drinks at any one sitting.  She has no other drug  "use.      FAMILY HISTORY:  Significant for alcoholic cirrhosis in her father.      REVIEW OF SYSTEMS:  A complete review of systems was negative other than that noted above.     Current Outpatient Medications   Medication     CITALOPRAM HYDROBROMIDE PO     PROGRAF (BRAND) 0.5 MG capsule     No current facility-administered medications for this visit.      /76   Pulse 85   Temp 97.7  F (36.5  C) (Oral)   Ht 1.727 m (5' 8\")   Wt 82.6 kg (182 lb 3.2 oz)   SpO2 96%   BMI 27.70 kg/m       PHYSICAL EXAMINATION:  In general, she looks well.  HEENT exam shows no scleral icterus or temporal muscle wasting.  Chest is clear.  Her abdominal exam shows no increase in girth.  No masses or tenderness to palpation are present.  Cardiac exam reveals no S3, S4 or murmur.  Extremity exam shows no edema.  Skin exam shows no suspicious lesions.  Neurologic exam is nonfocal.      LABORATORY:  Her most recent laboratory tests show her white count is 6.1, hemoglobin 15.2, platelets 140,000, sodium 140, potassium 4.0, BUN is 14, creatinine 0.6.  AST is 19, ALT is 29, alkaline phosphatase is 94, albumin is 3.4, total protein of 6.1, total bilirubin is 0.4.  Her Shannan-Barr virus DNA was undetectable.      IMPRESSION:  Ms. Diaz is now more than 11 years status post liver transplantation for secondary biliary cirrhosis.  She really is doing quite well without much complication from her transplant.  We did discuss the fact that she does need a flu shot every year, which she did have this year.  She is up to date with regard to other vaccinations as well.  I did discuss her long-term prognosis, which I think is excellent at this point in time.  I did point out that most people who lose their liver do so because of recurrence of disease.  She is on fairly minimal immunosuppression and while at some increase risk for cancer is fairly modest.  I did strongly advise her to stop smoking as I pointed out that smoking-related cancers " occur with a greater frequency in transplant patients.  She will get her blood tests done every 2-3 months and she will return to see me in 6 months and then just once a year after that.      Thank you very much for allowing me to participate in the care of this patient.  If you have any questions regarding my recommendations, please do not hesitate to contact me.       Nikita Velásquez MD      Professor of Medicine  [employer]      Executive Medical Director, Solid Organ Transplant Program  [facility]

## 2020-02-10 NOTE — LETTER
2/10/2020      RE: Jaclyn Diaz  [address] [facility] [address]       HISTORY OF PRESENT ILLNESS:  I had the pleasure of seeing Jaclyn Diaz for consultation in the Liver Transplant Clinic at the [facility] on 02/10/2020.  Ms. Diaz is now being transitioned from the Pediatric Liver Transplant program to the Adult Liver Transplant Program.  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  She just had biliary obstruction with secondary biliary cirrhosis.  Her posttransplant course was fairly unremarkable.  She is on tacrolimus as a single-agent immunosuppression.      She feels well at this visit.  She denies any abdominal pain, itching or skin rash or fatigue.  She works full time as a manager at [employer].  She denies any increased abdominal girth or lower extremity edema.      She denies any fevers or chills, cough or shortness of breath.  She denies any nausea, vomiting, diarrhea or constipation.  Her appetite has been good, and her weight for the most part has been stable.      PAST MEDICAL HISTORY:  Significant for a liver transplant back in 11/2008.  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  She has a history of depression, which seems to be under fairly good control.  She also carries a diagnosis of an eating disorder as well.      SOCIAL HISTORY:  She is a smoker.  She does drink alcohol perhaps every other week but generally does not have more than 2 drinks at any one sitting.  She has no other drug use.      FAMILY HISTORY:  Significant for alcoholic cirrhosis in her father.      REVIEW OF SYSTEMS:  A complete review of systems was negative other than that noted above.     Current Outpatient  "Medications   Medication     CITALOPRAM HYDROBROMIDE PO     PROGRAF (BRAND) 0.5 MG capsule     No current facility-administered medications for this visit.      /76   Pulse 85   Temp 97.7  F (36.5  C) (Oral)   Ht 1.727 m (5' 8\")   Wt 82.6 kg (182 lb 3.2 oz)   SpO2 96%   BMI 27.70 kg/m       PHYSICAL EXAMINATION:  In general, she looks well.  HEENT exam shows no scleral icterus or temporal muscle wasting.  Chest is clear.  Her abdominal exam shows no increase in girth.  No masses or tenderness to palpation are present.  Cardiac exam reveals no S3, S4 or murmur.  Extremity exam shows no edema.  Skin exam shows no suspicious lesions.  Neurologic exam is nonfocal.      LABORATORY:  Her most recent laboratory tests show her white count is 6.1, hemoglobin 15.2, platelets 140,000, sodium 140, potassium 4.0, BUN is 14, creatinine 0.6.  AST is 19, ALT is 29, alkaline phosphatase is 94, albumin is 3.4, total protein of 6.1, total bilirubin is 0.4.  Her Shannan-Barr virus DNA was undetectable.      IMPRESSION:  Ms. Diaz is now more than 11 years status post liver transplantation for secondary biliary cirrhosis.  She really is doing quite well without much complication from her transplant.  We did discuss the fact that she does need a flu shot every year, which she did have this year.  She is up to date with regard to other vaccinations as well.  I did discuss her long-term prognosis, which I think is excellent at this point in time.  I did point out that most people who lose their liver do so because of recurrence of disease.  She is on fairly minimal immunosuppression and while at some increase risk for cancer is fairly modest.  I did strongly advise her to stop smoking as I pointed out that smoking-related cancers occur with a greater frequency in transplant patients.  She will get her blood tests done every 2-3 months and she will return to see me in 6 months and then just once a year after that.      Thank " you very much for allowing me to participate in the care of this patient.  If you have any questions regarding my recommendations, please do not hesitate to contact me.       Nikita Velásquez MD      Professor of Medicine  [employer] [facility] [employer]      Executive Medical Director, Solid Organ Transplant Program  [facility]      Nikita Velásquez MD

## 2020-02-10 NOTE — PROGRESS NOTES
HISTORY OF PRESENT ILLNESS:  I had the pleasure of seeing Jaclyn Diaz for consultation in the Liver Transplant Clinic at the [facility] on 02/10/2020.  Ms. Diaz is now being transitioned from the Pediatric Liver Transplant program to the Adult Liver Transplant Program.  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  She just had biliary obstruction with secondary biliary cirrhosis.  Her posttransplant course was fairly unremarkable.  She is on tacrolimus as a single-agent immunosuppression.      She feels well at this visit.  She denies any abdominal pain, itching or skin rash or fatigue.  She works full time as a manager at [employer].  She denies any increased abdominal girth or lower extremity edema.      She denies any fevers or chills, cough or shortness of breath.  She denies any nausea, vomiting, diarrhea or constipation.  Her appetite has been good, and her weight for the most part has been stable.      PAST MEDICAL HISTORY:  Significant for a liver transplant back in 11/2008.  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  She has a history of depression, which seems to be under fairly good control.  She also carries a diagnosis of an eating disorder as well.      SOCIAL HISTORY:  She is a smoker.  She does drink alcohol perhaps every other week but generally does not have more than 2 drinks at any one sitting.  She has no other drug use.      FAMILY HISTORY:  Significant for alcoholic cirrhosis in her father.      REVIEW OF SYSTEMS:  A complete review of systems was negative other than that noted above.     Current Outpatient Medications   Medication     CITALOPRAM HYDROBROMIDE PO     PROGRAF (BRAND) 0.5 MG capsule     No  "current facility-administered medications for this visit.      /76   Pulse 85   Temp 97.7  F (36.5  C) (Oral)   Ht 1.727 m (5' 8\")   Wt 82.6 kg (182 lb 3.2 oz)   SpO2 96%   BMI 27.70 kg/m      PHYSICAL EXAMINATION:  In general, she looks well.  HEENT exam shows no scleral icterus or temporal muscle wasting.  Chest is clear.  Her abdominal exam shows no increase in girth.  No masses or tenderness to palpation are present.  Cardiac exam reveals no S3, S4 or murmur.  Extremity exam shows no edema.  Skin exam shows no suspicious lesions.  Neurologic exam is nonfocal.      LABORATORY:  Her most recent laboratory tests show her white count is 6.1, hemoglobin 15.2, platelets 140,000, sodium 140, potassium 4.0, BUN is 14, creatinine 0.6.  AST is 19, ALT is 29, alkaline phosphatase is 94, albumin is 3.4, total protein of 6.1, total bilirubin is 0.4.  Her Shannan-Barr virus DNA was undetectable.      IMPRESSION:  Ms. Diaz is now more than 11 years status post liver transplantation for secondary biliary cirrhosis.  She really is doing quite well without much complication from her transplant.  We did discuss the fact that she does need a flu shot every year, which she did have this year.  She is up to date with regard to other vaccinations as well.  I did discuss her long-term prognosis, which I think is excellent at this point in time.  I did point out that most people who lose their liver do so because of recurrence of disease.  She is on fairly minimal immunosuppression and while at some increase risk for cancer is fairly modest.  I did strongly advise her to stop smoking as I pointed out that smoking-related cancers occur with a greater frequency in transplant patients.  She will get her blood tests done every 2-3 months and she will return to see me in 6 months and then just once a year after that.      Thank you very much for allowing me to participate in the care of this patient.  If you have any " questions regarding my recommendations, please do not hesitate to contact me.       Nikita Velásquez MD      Professor of Medicine  [employer]      Executive Medical Director, Solid Organ Transplant Program  [facility]

## 2020-02-11 NOTE — NURSING NOTE
Met with Jaclyn.  This is her first appt w/ adult liver transplant, was previously followed by the pediatric team.  Peds robert Abdullahi, adult  Starr Claudio and myself present for the entire visit.    Jaclyn was a bit overwhelmed upon arrival.  Change to new team is stressful for her.    We reviewed labs.  We talked about current immunosuppression, importance of taking bid daily.  She had many questions about how she would know if something was wrong w/ her liver and how long this liver might last.  She does have some anxiety.  We provided a lot of encouragement reinforcing w/ review of labs.  Dr. Velásquez talked about long term results of liver transplant, stressing that she doesn't have a liver disease that reoccurs and that if she takes her immunosuppression as ordered her longterm prognosis is good.    I talked w/ her about some changes  Now that she is followed by the adult team:  1.) she should have a primary care doctor.  She doesn't but will try to establish at a [facility] clinic close toher home. 2.) she may want to establish w/ a gynecologist for birth control as we would not instruct or follow her for this.  We talked about the need for annual visits w/ us and regular lab testing.  She told us that it costs her about $1500 every time she has labs drawn and this has been a reason for her NOT doing them regularly.  Starr will check in to this.  We also encouraged her to call her insurance about this.  Jaclyn does drink about 2 drinks per week which Dr. Velásquez told her is OK and she does smoke.  WE talked w/ her about the risks associated w/ this.  She told us she will try to quit.  She is now aware of cancer risk.  We suggested she see a dermatologist annually.  Jaclyn works full time at [employer] in [place].    I told her that we will not contact her mom any more.  She gave me her cell phone number which I had tried to call earlier.  It doesn't have voicemail.  She said she will try to set this up.  I  stressed the importance of listening to voicemail and returning our calls as asked.  She agreed to do this.  I gave her a lab order to take w/ her but also reminded her that if she goes to a [facility] lab her orders are in place.  I provided her w/ the transplant office phone number.  She had no further questions.  The visit went well.

## 2020-02-24 DIAGNOSIS — Z94.4 LIVER REPLACED BY TRANSPLANT (H): ICD-10-CM

## 2020-02-24 RX ORDER — TACROLIMUS 0.5 MG/1
2 CAPSULE, GELATIN COATED ORAL EVERY 12 HOURS
Qty: 240 CAPSULE | Refills: 11 | Status: SHIPPED | OUTPATIENT
Start: 2020-02-24 | End: 2021-03-17

## 2020-02-25 NOTE — PROGRESS NOTES
Post Transplant Patient Social Work Assessment     Patient Name: Jaclyn Diaz  : 1995  Age: 24 year old  MRN: [id number]  Date of transplant: 2008    Jaclyn presents to [facility] for post liver transplant care.  She has previously been followed by [facility] pediatric providers.    Presenting Information   Living Situation: Jaclyn lives with her boyfriend in [place], [place].  She denies any concerns with her living situation.  Functional Status: Jaclyn is independent with her personal cares, ambulation, medication management and driving.  Cultural/Language/Spiritual Considerations: Mandaeism, English    Support System  Primary Support Persons: Parents (Brett and Choco) live nearby, boyfriend, brother Ernie    Health Care Directive  Decision Maker: patient   Alternate Decision Maker: Parents, Brett jose Choco Joe  Health Care Directive: Provided education    Mental Health/Coping:   History of Mental Health: depression, on citalopram 40mg since   History of Chemical Health: no know concerns, stressed importance of abstinence from drugs and alcohol  Current status: stable  Coping: Jaclyn was tearful today, and anxious about meeting her new providers.    Services Needed/Recommended: Liver Transplant Support Group education provided    Financial   Income: works full-time at [employer]  Impact of transplant on income: out of pocket medical costs  Insurance and medication coverage: [employer], Jaclyn uses a Prograf copay card and her cost is $0.  Financial concerns: Jaclyn is concerned about out of pocket costs for her labs, office visits, etc.  Resources needed: may qualify for [facility], referral made to Neda Cottrell (Liver Transplant Case Manager) to follow up with Jaclyn      Education provided by [facility]: Social Work role and contact information , availability of liver transplant support group, importance of complying with medical  directives and compliance with medications    Assessment and recommendations and plan:  Jacyln is most concerned about affording her past medical bills and out of pocket costs for her medical care.  Neda Cottrell, Liver Transplant Case Manager, will contact Jaclyn to discuss  and [facility] programs.          SAM Petersen, [facility]  Liver Transplant   Phone [phone]  Pager [phone]

## 2020-03-25 ENCOUNTER — TELEPHONE (OUTPATIENT)
Dept: TRANSPLANT | Facility: CLINIC | Age: 25
End: 2020-03-25

## 2020-05-29 ENCOUNTER — TELEPHONE (OUTPATIENT)
Dept: TRANSPLANT | Facility: CLINIC | Age: 25
End: 2020-05-29

## 2020-05-29 NOTE — TELEPHONE ENCOUNTER
Left a voicemail for patient to convert their in person visit with Dr Velásquez  on 6/5/20 to a video visit.

## 2020-06-05 ENCOUNTER — VIRTUAL VISIT (OUTPATIENT)
Dept: GASTROENTEROLOGY | Facility: CLINIC | Age: 25
End: 2020-06-05
Attending: INTERNAL MEDICINE
Payer: COMMERCIAL

## 2020-06-05 DIAGNOSIS — Z94.4 LIVER REPLACED BY TRANSPLANT (H): Primary | ICD-10-CM

## 2020-06-05 ASSESSMENT — PAIN SCALES - GENERAL: PAINLEVEL: NO PAIN (0)

## 2020-06-05 NOTE — LETTER
"    6/5/2020         RE: Jaclyn Diaz  [address] [address]        Dear Colleague,    Thank you for referring your patient, Jaclyn Diaz, to the [facility] HEPATOLOGY. Please see a copy of my visit note below.    Tried calling patient x2, no answer, msg left, will try back in a bit    Jaclyn Diaz is a 24 year old female who is being evaluated via a billable telephone visit.      The patient has been notified of following:     \"This telephone visit will be conducted via a call between you and your physician/provider. We have found that certain health care needs can be provided without the need for a physical exam.  This service lets us provide the care you need with a short phone conversation.  If a prescription is necessary we can send it directly to your pharmacy.  If lab work is needed we can place an order for that and you can then stop by our lab to have the test done at a later time.    Telephone visits are billed at different rates depending on your insurance coverage. During this emergency period, for some insurers they may be billed the same as an in-person visit.  Please reach out to your insurance provider with any questions.    If during the course of the call the physician/provider feels a telephone visit is not appropriate, you will not be charged for this service.\"    Patient has given verbal consent for Telephone visit?  Yes    What phone number would you like to be contacted at? [phone]    How would you like to obtain your AVS?     I had the pleasure of seeing Jaclyn Diaz for follow-up via phone visit in the Liver Transplant Clinic at the [facility] on 06/05/2020.  Ms. Diaz has now  transitioned from the Pediatric Liver Transplant program to the Adult Liver Transplant Program.  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      She feels well at this visit.  She denies any " abdominal pain, itching or skin rash or fatigue.  She works full time as a manager at [email or web address].  She denies any increased abdominal girth or lower extremity edema.      She denies any fevers or chills, cough or shortness of breath.  She denies any nausea, vomiting, diarrhea or constipation.  Her appetite has been good, and her weight for the most part has been stable.     Current Outpatient Medications   Medication     CITALOPRAM HYDROBROMIDE PO     PROGRAF (BRAND) 0.5 MG capsule     No current facility-administered medications for this visit.      On physical examination, she sounds well.  Her affect is appropriate.      Laboratory testing will be performed next week.      Ms. Diaz is now almost 12 years status post liver transplantation for secondary biliary cirrhosis. She really is doing quite well without much complication from her transplant.  She is up to date with regard to vaccinations.  She is on fairly minimal immunosuppression and while at some increase risk for cancer is fairly modest.  I did strongly advise her to stop smoking as I pointed out that smoking-related cancers occur with a greater frequency in transplant patients.  She will get her blood tests done next week and she will return to see me just once a year.t.      Thank you very much for allowing me to participate in the care of this patient.  If you have any questions regarding my recommendations, please do not hesitate to contact me.         Nikita Velásquez MD      Professor of Medicine  [employer]      Executive Medical Director, Solid Organ Transplant Program  [facility]    Phone call duration: 15 minutes with 10 minutes for documentation.            Again, thank you for allowing me to participate in the care of your patient.        Sincerely,        Nikita Velásquez MD

## 2020-06-05 NOTE — PROGRESS NOTES
"Tried calling patient x2, no answer, msg left, will try back in a bit    Jaclyn Diaz is a 24 year old female who is being evaluated via a billable telephone visit.      The patient has been notified of following:     \"This telephone visit will be conducted via a call between you and your physician/provider. We have found that certain health care needs can be provided without the need for a physical exam.  This service lets us provide the care you need with a short phone conversation.  If a prescription is necessary we can send it directly to your pharmacy.  If lab work is needed we can place an order for that and you can then stop by our lab to have the test done at a later time.    Telephone visits are billed at different rates depending on your insurance coverage. During this emergency period, for some insurers they may be billed the same as an in-person visit.  Please reach out to your insurance provider with any questions.    If during the course of the call the physician/provider feels a telephone visit is not appropriate, you will not be charged for this service.\"    Patient has given verbal consent for Telephone visit?  Yes    What phone number would you like to be contacted at? [phone]    How would you like to obtain your AVS?     I had the pleasure of seeing Jaclyn Diaz for follow-up via phone visit in the Liver Transplant Clinic at the [facility] on 06/05/2020.  Ms. Diaz has now  transitioned from the Pediatric Liver Transplant program to the Adult Liver Transplant Program.  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      She feels well at this visit.  She denies any abdominal pain, itching or skin rash or fatigue.  She works full time as a manager at [employer].  She denies any increased abdominal girth or lower extremity edema.      She denies any fevers or chills, cough or shortness of breath.  She denies any nausea, " vomiting, diarrhea or constipation.  Her appetite has been good, and her weight for the most part has been stable.     Current Outpatient Medications   Medication     CITALOPRAM HYDROBROMIDE PO     PROGRAF (BRAND) 0.5 MG capsule     No current facility-administered medications for this visit.      On physical examination, she sounds well.  Her affect is appropriate.      Laboratory testing will be performed next week.      Ms. Diaz is now almost 12 years status post liver transplantation for secondary biliary cirrhosis. She really is doing quite well without much complication from her transplant.  She is up to date with regard to vaccinations.  She is on fairly minimal immunosuppression and while at some increase risk for cancer is fairly modest.  I did strongly advise her to stop smoking as I pointed out that smoking-related cancers occur with a greater frequency in transplant patients.  She will get her blood tests done next week and she will return to see me just once a year.t.      Thank you very much for allowing me to participate in the care of this patient.  If you have any questions regarding my recommendations, please do not hesitate to contact me.         Nikita Velásquez MD      Professor of Medicine  [employer]      Executive Medical Director, Solid Organ Transplant Program  [facility]    Phone call duration: 15 minutes with 10 minutes for documentation.

## 2020-06-08 ENCOUNTER — TELEPHONE (OUTPATIENT)
Dept: GASTROENTEROLOGY | Facility: CLINIC | Age: 25
End: 2020-06-08

## 2021-01-06 ENCOUNTER — TELEPHONE (OUTPATIENT)
Dept: TRANSPLANT | Facility: CLINIC | Age: 26
End: 2021-01-06

## 2021-01-06 NOTE — TELEPHONE ENCOUNTER
Call to Jaclyn to check in. Her dad had a transplant a few weeks ago.  I reminded Jaclyn that she is overdue for lab testing.  She is doing well.  She plans to go in for labs in the next month.

## 2021-01-15 ENCOUNTER — HEALTH MAINTENANCE LETTER (OUTPATIENT)
Age: 26
End: 2021-01-15

## 2021-01-22 ENCOUNTER — E-VISIT (OUTPATIENT)
Dept: URGENT CARE | Facility: URGENT CARE | Age: 26
End: 2021-01-22
Payer: COMMERCIAL

## 2021-01-22 ENCOUNTER — OFFICE VISIT (OUTPATIENT)
Dept: URGENT CARE | Facility: URGENT CARE | Age: 26
End: 2021-01-22
Attending: FAMILY MEDICINE
Payer: COMMERCIAL

## 2021-01-22 DIAGNOSIS — Z20.822 SUSPECTED COVID-19 VIRUS INFECTION: Primary | ICD-10-CM

## 2021-01-22 DIAGNOSIS — Z20.822 SUSPECTED COVID-19 VIRUS INFECTION: ICD-10-CM

## 2021-01-22 PROCEDURE — 99421 OL DIG E/M SVC 5-10 MIN: CPT | Performed by: FAMILY MEDICINE

## 2021-01-22 PROCEDURE — U0003 INFECTIOUS AGENT DETECTION BY NUCLEIC ACID (DNA OR RNA); SEVERE ACUTE RESPIRATORY SYNDROME CORONAVIRUS 2 (SARS-COV-2) (CORONAVIRUS DISEASE [COVID-19]), AMPLIFIED PROBE TECHNIQUE, MAKING USE OF HIGH THROUGHPUT TECHNOLOGIES AS DESCRIBED BY CMS-2020-01-R: HCPCS | Performed by: FAMILY MEDICINE

## 2021-01-22 PROCEDURE — U0005 INFEC AGEN DETEC AMPLI PROBE: HCPCS | Performed by: FAMILY MEDICINE

## 2021-01-22 NOTE — PATIENT INSTRUCTIONS
Dear Jaclyn Diaz,    Your symptoms show that you may have coronavirus (COVID-19). This illness can cause fever, cough and trouble breathing. Many people get a mild case and get better on their own. Some people can get very sick.    Will I be tested for COVID-19?  We would like to test you for Covid-19 virus. I have placed orders for this test.     To schedule: go to your [employer] home page and scroll down to the section that says  You have an appointment that needs to be scheduled  and click the large green button that says  Schedule Now  and follow the steps to find the next available openings.    If you are unable to complete these [employer] scheduling steps, please call [phone] to schedule your testing.     Return to work/school/ guidance:  Please let your workplace manager and staffing office know when your quarantine ends     We can t give you an exact date as it depends on the above. You can calculate this on your own or work with your manager/staffing office to calculate this. (For example if you were exposed on 10/4, you would have to quarantine for 14 full days. That would be through 10/18. You could return on 10/19.)      If you receive a positive COVID-19 test result, follow the guidance of the those who are giving you the results. Usually the return to work is 10 (or in some cases 20 days from symptom onset.) If you work at [facility], you must also be cleared by Employee Occupational Health and Safety to return to work.        If you receive a negative COVID-19 test result and did not have a high risk exposure to someone with a known positive COVID-19 test, you can return to work once you're free of fever for 24 hours without fever-reducing medication and your symptoms are improving or resolved.      If you receive a negative COVID-19 test and If you had a high risk exposure to someone who has tested positive for COVID-19 then you can return to work 14 days after your last  contact with the positive individual    Note: If you have ongoing exposure to the covid positive person, this quarantine period may be more than 14 days. (For example, if you are continued to be exposed to your child who tested positive and cannot isolate from them, then the quarantine of 7-14 days can't start until your child is no longer contagious. This is typically 10 days from onset of the child's symptoms. So the total duration may be 17-24 days in this case.)    Sign up for [employer].   We know it's scary to hear that you might have COVID-19. We want to track your symptoms to make sure you're okay over the next 2 weeks. Please look for an email from [employer]--this is a free, online program that we'll use to keep in touch. To sign up, follow the link in the email you will receive. Learn more at [email or web address]    How can I take care of myself?    Get lots of rest. Drink extra fluids (unless a doctor has told you not to)    Take Tylenol (acetaminophen) or ibuprofen for fever or pain. If you have liver or kidney problems, ask your family doctor if it's okay to take Tylenol o ibuprofen    If you have other health problems (like cancer, heart failure, an organ transplant or severe kidney disease): Call your specialty clinic if you don't feel better in the next 2 days.    Know when to call 911. Emergency warning signs include:  o Trouble breathing or shortness of breath  o Pain or pressure in the chest that doesn't go away  o Feeling confused like you haven't felt before, or not being able to wake up  o Bluish-colored lips or face    Where can I get more information?  [facility] - About COVID-19:   [email or web address]    CDC - What to Do If You're Sick:   www.cdc.gov/coronavirus/2019-ncov/about/steps-when-sick.html    Dear Jaclyn Diaz,    Your symptoms show that you may have coronavirus (COVID-19). This illness can cause fever, cough and trouble breathing.  Many people get a mild case and get better on their own. Some people can get very sick.    Will I be tested for COVID-19?  We would like to test you for Covid-19 virus. I have placed orders for this test.     For all employees or close contacts (except [place] and [employer] - see below), go to your [employer] home page and scroll down to the section that says  You have an appointment that needs to be scheduled  and click the large green button that says  Schedule Now  and follow the steps to find the next available opening.     If you are unable to complete these steps or if you cannot find any available times, please call [phone] to schedule employee testing.     [place] employees or close contacts, please call [phone].   [place] [employer] employees or close contacts call [phone].    Return to work/school/ guidance:  Please let your workplace manager and staffing office know when your quarantine ends     We can t give you an exact date as it depends on the above. You can calculate this on your own or work with your manager/staffing office to calculate this. (For example if you were exposed on 10/4, you would have to quarantine for 14 full days. That would be through 10/18. You could return on 10/19.)      If you receive a positive COVID-19 test result, follow the guidance of the those who are giving you the results. Usually the return to work is 10 (or in some cases 20 days from symptom onset.) If you work at [facility], you must also be cleared by Employee Occupational Health and Safety to return to work.        If you receive a negative COVID-19 test result and did not have a high risk exposure to someone with a known positive COVID-19 test, you can return to work once you're free of fever for 24 hours without fever-reducing medication and your symptoms are improving or resolved.      If you receive a negative COVID-19 test and If you had a high risk exposure to someone who has tested  positive for COVID-19 then you can return to work 14 days after your last contact with the positive individual    Note: If you have ongoing exposure to the covid positive person, this quarantine period may be more than 14 days. (For example, if you are continued to be exposed to your child who tested positive and cannot isolate from them, then the quarantine of 7-14 days can't start until your child is no longer contagious. This is typically 10 days from onset of the child's symptoms. So the total duration may be 17-24 days in this case.)    Sign up for [employer].   We know it's scary to hear that you might have COVID-19. We want to track your symptoms to make sure you're okay over the next 2 weeks. Please look for an email from [employer]--this is a free, online program that we'll use to keep in touch. To sign up, follow the link in the email you will receive. Learn more at [email or web address]    How can I take care of myself?    Get lots of rest. Drink extra fluids (unless a doctor has told you not to)    Take Tylenol (acetaminophen) or ibuprofen for fever or pain. If you have liver or kidney problems, ask your family doctor if it's okay to take Tylenol o ibuprofen    If you have other health problems (like cancer, heart failure, an organ transplant or severe kidney disease): Call your specialty clinic if you don't feel better in the next 2 days.    Know when to call 911. Emergency warning signs include:  o Trouble breathing or shortness of breath  o Pain or pressure in the chest that doesn't go away  o Feeling confused like you haven't felt before, or not being able to wake up  o Bluish-colored lips or face    Where can I get more information?   [facility] - About COVID-19:   [email or web address]    CDC - What to Do If You're Sick:   www.cdc.gov/coronavirus/2019-ncov/about/steps-when-sick.html

## 2021-01-23 LAB
SARS-COV-2 RNA RESP QL NAA+PROBE: NOT DETECTED
SPECIMEN SOURCE: NORMAL

## 2021-02-10 DIAGNOSIS — Z94.4 LIVER REPLACED BY TRANSPLANT (H): ICD-10-CM

## 2021-02-10 DIAGNOSIS — Z13.220 LIPID SCREENING: ICD-10-CM

## 2021-03-17 DIAGNOSIS — Z94.4 LIVER REPLACED BY TRANSPLANT (H): ICD-10-CM

## 2021-03-17 RX ORDER — TACROLIMUS 0.5 MG/1
CAPSULE, GELATIN COATED ORAL
Qty: 240 CAPSULE | Refills: 11 | Status: SHIPPED | OUTPATIENT
Start: 2021-03-17 | End: 2022-03-24

## 2021-03-18 ENCOUNTER — DOCUMENTATION ONLY (OUTPATIENT)
Dept: TRANSPLANT | Facility: CLINIC | Age: 26
End: 2021-03-18

## 2021-03-22 DIAGNOSIS — Z94.4 LIVER REPLACED BY TRANSPLANT (H): ICD-10-CM

## 2021-03-22 DIAGNOSIS — Z13.220 LIPID SCREENING: ICD-10-CM

## 2021-03-22 LAB
ALBUMIN SERPL-MCNC: 3.7 G/DL (ref 3.4–5)
ALBUMIN UR-MCNC: NEGATIVE MG/DL
ALP SERPL-CCNC: 113 U/L (ref 40–150)
ALT SERPL W P-5'-P-CCNC: 29 U/L (ref 0–50)
ANION GAP SERPL CALCULATED.3IONS-SCNC: 6 MMOL/L (ref 3–14)
APPEARANCE UR: CLEAR
AST SERPL W P-5'-P-CCNC: 14 U/L (ref 0–45)
BACTERIA #/AREA URNS HPF: ABNORMAL /HPF
BILIRUB DIRECT SERPL-MCNC: 0.2 MG/DL (ref 0–0.2)
BILIRUB SERPL-MCNC: 0.5 MG/DL (ref 0.2–1.3)
BILIRUB UR QL STRIP: NEGATIVE
BUN SERPL-MCNC: 12 MG/DL (ref 7–30)
CALCIUM SERPL-MCNC: 8.6 MG/DL (ref 8.5–10.1)
CHLORIDE SERPL-SCNC: 110 MMOL/L (ref 94–109)
CHOLEST SERPL-MCNC: 115 MG/DL
CO2 SERPL-SCNC: 22 MMOL/L (ref 20–32)
COLOR UR AUTO: ABNORMAL
CREAT SERPL-MCNC: 0.72 MG/DL (ref 0.52–1.04)
CREAT UR-MCNC: 29 MG/DL
ERYTHROCYTE [DISTWIDTH] IN BLOOD BY AUTOMATED COUNT: 11.6 % (ref 10–15)
GFR SERPL CREATININE-BSD FRML MDRD: >90 ML/MIN/{1.73_M2}
GLUCOSE SERPL-MCNC: 99 MG/DL (ref 70–99)
GLUCOSE UR STRIP-MCNC: NEGATIVE MG/DL
HCT VFR BLD AUTO: 43.5 % (ref 35–47)
HDLC SERPL-MCNC: 60 MG/DL
HGB BLD-MCNC: 15.7 G/DL (ref 11.7–15.7)
HGB UR QL STRIP: NEGATIVE
KETONES UR STRIP-MCNC: NEGATIVE MG/DL
LDLC SERPL CALC-MCNC: 28 MG/DL
LEUKOCYTE ESTERASE UR QL STRIP: ABNORMAL
MCH RBC QN AUTO: 32.3 PG (ref 26.5–33)
MCHC RBC AUTO-ENTMCNC: 36.1 G/DL (ref 31.5–36.5)
MCV RBC AUTO: 90 FL (ref 78–100)
NITRATE UR QL: NEGATIVE
NONHDLC SERPL-MCNC: 55 MG/DL
PH UR STRIP: 5.5 PH (ref 5–7)
PLATELET # BLD AUTO: 155 10E9/L (ref 150–450)
POTASSIUM SERPL-SCNC: 4.1 MMOL/L (ref 3.4–5.3)
PROT SERPL-MCNC: 6.7 G/DL (ref 6.8–8.8)
PROT UR-MCNC: <0.05 G/L
PROT/CREAT 24H UR: NORMAL G/G CR (ref 0–0.2)
RBC # BLD AUTO: 4.86 10E12/L (ref 3.8–5.2)
RBC #/AREA URNS AUTO: 1 /HPF (ref 0–2)
SODIUM SERPL-SCNC: 138 MMOL/L (ref 133–144)
SOURCE: ABNORMAL
SP GR UR STRIP: 1.01 (ref 1–1.03)
SQUAMOUS #/AREA URNS AUTO: 3 /HPF (ref 0–1)
TACROLIMUS BLD-MCNC: 3.7 UG/L (ref 5–15)
TME LAST DOSE: ABNORMAL H
TRIGL SERPL-MCNC: 134 MG/DL
UROBILINOGEN UR STRIP-MCNC: NORMAL MG/DL (ref 0–2)
WBC # BLD AUTO: 6.7 10E9/L (ref 4–11)
WBC #/AREA URNS AUTO: 11 /HPF (ref 0–5)

## 2021-03-22 PROCEDURE — 36415 COLL VENOUS BLD VENIPUNCTURE: CPT | Performed by: INTERNAL MEDICINE

## 2021-03-22 PROCEDURE — 80061 LIPID PANEL: CPT | Performed by: INTERNAL MEDICINE

## 2021-03-22 PROCEDURE — 84156 ASSAY OF PROTEIN URINE: CPT | Performed by: INTERNAL MEDICINE

## 2021-03-22 PROCEDURE — 85027 COMPLETE CBC AUTOMATED: CPT | Performed by: INTERNAL MEDICINE

## 2021-03-22 PROCEDURE — 80076 HEPATIC FUNCTION PANEL: CPT | Performed by: INTERNAL MEDICINE

## 2021-03-22 PROCEDURE — 80197 ASSAY OF TACROLIMUS: CPT | Performed by: INTERNAL MEDICINE

## 2021-03-22 PROCEDURE — 81001 URINALYSIS AUTO W/SCOPE: CPT | Performed by: INTERNAL MEDICINE

## 2021-03-22 PROCEDURE — 80048 BASIC METABOLIC PNL TOTAL CA: CPT | Performed by: INTERNAL MEDICINE

## 2021-03-23 ENCOUNTER — TELEPHONE (OUTPATIENT)
Dept: TRANSPLANT | Facility: CLINIC | Age: 26
End: 2021-03-23

## 2021-03-23 ASSESSMENT — ENCOUNTER SYMPTOMS: NEW SYMPTOMS OF CORONARY ARTERY DISEASE: 0

## 2021-03-23 NOTE — TELEPHONE ENCOUNTER
Call to Jaclyn to check in / review labs.  She is relatively new to the adult team and was pretty anxious when we saw her the first time. Her dad had a liver transplant this year also and I wanted to make sure she is doing OK.  I reviewed her labs w/ her, reassured her that all looks good, stressed the importance of taking tacro bid every day.    She continues to work and has a supportive boyfriend.  She reports she is doing well.  Reminded her to do labs every 3-4 mos

## 2021-09-04 ENCOUNTER — HEALTH MAINTENANCE LETTER (OUTPATIENT)
Age: 26
End: 2021-09-04

## 2022-02-19 ENCOUNTER — HEALTH MAINTENANCE LETTER (OUTPATIENT)
Age: 27
End: 2022-02-19

## 2022-03-10 ENCOUNTER — DOCUMENTATION ONLY (OUTPATIENT)
Dept: TRANSPLANT | Facility: CLINIC | Age: 27
End: 2022-03-10
Payer: COMMERCIAL

## 2022-03-10 NOTE — PROGRESS NOTES
Annual chart review completed.      Patient is very late for lab testing - hasn't done them for almost a year. Also overdue for hepatology follow-up with Dr Velásquez.    Please send letter reminding her that she should be doing labs every 3-4 mos and have a visit w/ Dr. Velásquez every year, remind her that she could do a virtual visit.

## 2022-03-23 DIAGNOSIS — Z13.220 LIPID SCREENING: ICD-10-CM

## 2022-03-23 DIAGNOSIS — Z94.4 LIVER REPLACED BY TRANSPLANT (H): ICD-10-CM

## 2022-03-24 DIAGNOSIS — Z94.4 LIVER REPLACED BY TRANSPLANT (H): ICD-10-CM

## 2022-03-24 RX ORDER — TACROLIMUS 0.5 MG/1
CAPSULE, GELATIN COATED ORAL
Qty: 240 CAPSULE | Refills: 11 | Status: SHIPPED | OUTPATIENT
Start: 2022-03-24 | End: 2022-10-14 | Stop reason: DRUGHIGH

## 2022-04-04 ENCOUNTER — LAB (OUTPATIENT)
Dept: LAB | Facility: CLINIC | Age: 27
End: 2022-04-04
Payer: COMMERCIAL

## 2022-04-04 DIAGNOSIS — Z94.4 LIVER REPLACED BY TRANSPLANT (H): ICD-10-CM

## 2022-04-04 DIAGNOSIS — Z13.220 LIPID SCREENING: ICD-10-CM

## 2022-04-04 LAB
ALBUMIN SERPL-MCNC: 3.3 G/DL (ref 3.4–5)
ALP SERPL-CCNC: 133 U/L (ref 40–150)
ALT SERPL W P-5'-P-CCNC: 85 U/L (ref 0–50)
ANION GAP SERPL CALCULATED.3IONS-SCNC: 6 MMOL/L (ref 3–14)
AST SERPL W P-5'-P-CCNC: 41 U/L (ref 0–45)
BILIRUB DIRECT SERPL-MCNC: 0.1 MG/DL (ref 0–0.2)
BILIRUB SERPL-MCNC: 0.6 MG/DL (ref 0.2–1.3)
BUN SERPL-MCNC: 14 MG/DL (ref 7–30)
CALCIUM SERPL-MCNC: 8.5 MG/DL (ref 8.5–10.1)
CHLORIDE BLD-SCNC: 107 MMOL/L (ref 94–109)
CO2 SERPL-SCNC: 24 MMOL/L (ref 20–32)
CREAT SERPL-MCNC: 0.65 MG/DL (ref 0.52–1.04)
ERYTHROCYTE [DISTWIDTH] IN BLOOD BY AUTOMATED COUNT: 12.6 % (ref 10–15)
GFR SERPL CREATININE-BSD FRML MDRD: >90 ML/MIN/1.73M2
GLUCOSE BLD-MCNC: 103 MG/DL (ref 70–99)
HCT VFR BLD AUTO: 45.1 % (ref 35–47)
HGB BLD-MCNC: 16.2 G/DL (ref 11.7–15.7)
MCH RBC QN AUTO: 32.9 PG (ref 26.5–33)
MCHC RBC AUTO-ENTMCNC: 35.9 G/DL (ref 31.5–36.5)
MCV RBC AUTO: 92 FL (ref 78–100)
PLATELET # BLD AUTO: 157 10E3/UL (ref 150–450)
POTASSIUM BLD-SCNC: 3.9 MMOL/L (ref 3.4–5.3)
PROT SERPL-MCNC: 6.8 G/DL (ref 6.8–8.8)
RBC # BLD AUTO: 4.93 10E6/UL (ref 3.8–5.2)
SODIUM SERPL-SCNC: 137 MMOL/L (ref 133–144)
TACROLIMUS BLD-MCNC: 1.4 UG/L (ref 5–15)
TME LAST DOSE: ABNORMAL H
TME LAST DOSE: ABNORMAL H
WBC # BLD AUTO: 6.6 10E3/UL (ref 4–11)

## 2022-04-04 PROCEDURE — 85027 COMPLETE CBC AUTOMATED: CPT

## 2022-04-04 PROCEDURE — 80197 ASSAY OF TACROLIMUS: CPT

## 2022-04-04 PROCEDURE — 36415 COLL VENOUS BLD VENIPUNCTURE: CPT

## 2022-04-04 PROCEDURE — 80053 COMPREHEN METABOLIC PANEL: CPT

## 2022-04-04 PROCEDURE — 82248 BILIRUBIN DIRECT: CPT

## 2022-04-05 ENCOUNTER — TELEPHONE (OUTPATIENT)
Dept: TRANSPLANT | Facility: CLINIC | Age: 27
End: 2022-04-05
Payer: COMMERCIAL

## 2022-04-05 DIAGNOSIS — Z94.4 STATUS POST LIVER TRANSPLANTATION (H): Primary | ICD-10-CM

## 2022-04-05 DIAGNOSIS — Z94.4 LIVER REPLACED BY TRANSPLANT (H): ICD-10-CM

## 2022-04-05 RX ORDER — TACROLIMUS 1 MG/1
3 CAPSULE, GELATIN COATED ORAL EVERY 12 HOURS
Qty: 540 CAPSULE | Refills: 3 | Status: SHIPPED | OUTPATIENT
Start: 2022-04-05 | End: 2022-04-11

## 2022-04-05 NOTE — TELEPHONE ENCOUNTER
ISSUE:   Tacrolimus IR level 1.4 on 4/4, goal 3-5, dose 2 mg BID.    PLAN:   Please call patient and confirm this was an accurate 12-hour trough. Verify Tacrolimus IR dose and that she is taking bid as ordered.  Her ALT is up.   Confirm no new medications or illness. Confirm no missed doses. If accurate trough and accurate dose, increase Tacrolimus IR dose to 3 mg BID and repeat labs early next week.   Sarina Escamilla RN  OUTCOME:   Spoke with patient, they confirm accurate trough level and current dose 2 mg BID. Patient confirmed dose change to 3 mg BID and to repeat labs in 1 weeks. Orders sent to preferred pharmacy for dose change and lab for repeat labs. Patient voiced understanding of plan.    Left a message for pt stating that prograf 1mg capsules will be sent to the pharmacy.     Griselda Malin LPN

## 2022-04-05 NOTE — TELEPHONE ENCOUNTER
"Call to Jaclyn to discuss elevated ALT / low tac level.  Unable to leave a message as \"voicemail box has not been set up\".  Sent her a [employer] message explaining importance of compliance w/ tac bid dosing, concern for chronic rejection if not compliant.   "

## 2022-04-11 ENCOUNTER — TELEPHONE (OUTPATIENT)
Dept: GASTROENTEROLOGY | Facility: CLINIC | Age: 27
End: 2022-04-11
Payer: COMMERCIAL

## 2022-04-11 DIAGNOSIS — Z94.4 STATUS POST LIVER TRANSPLANTATION (H): ICD-10-CM

## 2022-04-11 RX ORDER — TACROLIMUS 1 MG/1
3 CAPSULE, GELATIN COATED ORAL EVERY 12 HOURS
Qty: 540 CAPSULE | Refills: 3 | Status: SHIPPED | OUTPATIENT
Start: 2022-04-11 | End: 2022-10-14 | Stop reason: DRUGHIGH

## 2022-04-11 NOTE — TELEPHONE ENCOUNTER
**URGENT**     Hello,     Due to the patient's insurance, the prograf prescription needs to be written JANEE 1. Please resend prograf rx from 4/5/22 with JANEE 1 indicated. Please call pharmacy with any questions!    Thank you,  Haley Diaz, [facility]  [phone]

## 2022-10-14 ENCOUNTER — TELEPHONE (OUTPATIENT)
Dept: TRANSPLANT | Facility: CLINIC | Age: 27
End: 2022-10-14

## 2022-10-14 DIAGNOSIS — Z94.4 LIVER REPLACED BY TRANSPLANT (H): Primary | ICD-10-CM

## 2022-10-14 RX ORDER — TACROLIMUS 1 MG/1
3 CAPSULE ORAL EVERY 12 HOURS
Qty: 540 CAPSULE | Refills: 3 | Status: SHIPPED | OUTPATIENT
Start: 2022-10-14 | End: 2023-12-08

## 2022-10-14 NOTE — TELEPHONE ENCOUNTER
Elizabeth calls to state that the prograf coupon for this year has  and would like a new script for tacro sent. Writer e-scribed for tacro and noted that pt will need to have tx labs drawn a few days after starting the new brand.

## 2022-10-16 ENCOUNTER — HEALTH MAINTENANCE LETTER (OUTPATIENT)
Age: 27
End: 2022-10-16

## 2023-02-10 ENCOUNTER — TELEPHONE (OUTPATIENT)
Dept: TRANSPLANT | Facility: CLINIC | Age: 28
End: 2023-02-10

## 2023-02-10 ENCOUNTER — LAB (OUTPATIENT)
Dept: LAB | Facility: CLINIC | Age: 28
End: 2023-02-10
Payer: COMMERCIAL

## 2023-02-10 DIAGNOSIS — Z94.4 LIVER REPLACED BY TRANSPLANT (H): ICD-10-CM

## 2023-02-10 DIAGNOSIS — Z13.220 LIPID SCREENING: ICD-10-CM

## 2023-02-10 DIAGNOSIS — R31.9 HEMATURIA: Primary | ICD-10-CM

## 2023-02-10 LAB
ALBUMIN MFR UR ELPH: 21 MG/DL (ref 1–14)
ALBUMIN SERPL BCG-MCNC: 4.2 G/DL (ref 3.5–5.2)
ALBUMIN UR-MCNC: NEGATIVE MG/DL
ALP SERPL-CCNC: 145 U/L (ref 35–104)
ALT SERPL W P-5'-P-CCNC: 70 U/L (ref 10–35)
ANION GAP SERPL CALCULATED.3IONS-SCNC: 10 MMOL/L (ref 7–15)
APPEARANCE UR: CLEAR
AST SERPL W P-5'-P-CCNC: 45 U/L (ref 10–35)
BACTERIA #/AREA URNS HPF: ABNORMAL /HPF
BILIRUB DIRECT SERPL-MCNC: <0.2 MG/DL (ref 0–0.3)
BILIRUB SERPL-MCNC: 0.4 MG/DL
BILIRUB UR QL STRIP: NEGATIVE
BUN SERPL-MCNC: 10.8 MG/DL (ref 6–20)
CALCIUM SERPL-MCNC: 8.8 MG/DL (ref 8.6–10)
CHLORIDE SERPL-SCNC: 106 MMOL/L (ref 98–107)
CHOLEST SERPL-MCNC: 135 MG/DL
COLOR UR AUTO: YELLOW
CREAT SERPL-MCNC: 0.83 MG/DL (ref 0.51–0.95)
CREAT UR-MCNC: 119 MG/DL
DEPRECATED HCO3 PLAS-SCNC: 22 MMOL/L (ref 22–29)
ERYTHROCYTE [DISTWIDTH] IN BLOOD BY AUTOMATED COUNT: 12.6 % (ref 10–15)
GFR SERPL CREATININE-BSD FRML MDRD: >90 ML/MIN/1.73M2
GLUCOSE SERPL-MCNC: 110 MG/DL (ref 70–99)
GLUCOSE UR STRIP-MCNC: NEGATIVE MG/DL
HCT VFR BLD AUTO: 44.2 % (ref 35–47)
HDLC SERPL-MCNC: 48 MG/DL
HGB BLD-MCNC: 15.8 G/DL (ref 11.7–15.7)
HGB UR QL STRIP: ABNORMAL
KETONES UR STRIP-MCNC: NEGATIVE MG/DL
LDLC SERPL CALC-MCNC: 58 MG/DL
LEUKOCYTE ESTERASE UR QL STRIP: NEGATIVE
MCH RBC QN AUTO: 33.6 PG (ref 26.5–33)
MCHC RBC AUTO-ENTMCNC: 35.7 G/DL (ref 31.5–36.5)
MCV RBC AUTO: 94 FL (ref 78–100)
NITRATE UR QL: NEGATIVE
NONHDLC SERPL-MCNC: 87 MG/DL
PH UR STRIP: 5.5 [PH] (ref 5–7)
PLATELET # BLD AUTO: 157 10E3/UL (ref 150–450)
POTASSIUM SERPL-SCNC: 4.2 MMOL/L (ref 3.4–5.3)
PROT SERPL-MCNC: 6.6 G/DL (ref 6.4–8.3)
PROT/CREAT 24H UR: 0.18 MG/MG CR (ref 0–0.2)
RBC # BLD AUTO: 4.7 10E6/UL (ref 3.8–5.2)
RBC #/AREA URNS AUTO: ABNORMAL /HPF
SODIUM SERPL-SCNC: 138 MMOL/L (ref 136–145)
SP GR UR STRIP: >=1.03 (ref 1–1.03)
SQUAMOUS #/AREA URNS AUTO: ABNORMAL /LPF
TACROLIMUS BLD-MCNC: 3.6 UG/L (ref 5–15)
TME LAST DOSE: ABNORMAL H
TME LAST DOSE: ABNORMAL H
TRIGL SERPL-MCNC: 147 MG/DL
UROBILINOGEN UR STRIP-ACNC: 0.2 E.U./DL
WBC # BLD AUTO: 7.6 10E3/UL (ref 4–11)
WBC #/AREA URNS AUTO: ABNORMAL /HPF

## 2023-02-10 PROCEDURE — 81001 URINALYSIS AUTO W/SCOPE: CPT

## 2023-02-10 PROCEDURE — 36415 COLL VENOUS BLD VENIPUNCTURE: CPT

## 2023-02-10 PROCEDURE — 82248 BILIRUBIN DIRECT: CPT

## 2023-02-10 PROCEDURE — 80053 COMPREHEN METABOLIC PANEL: CPT

## 2023-02-10 PROCEDURE — 85027 COMPLETE CBC AUTOMATED: CPT

## 2023-02-10 PROCEDURE — 84156 ASSAY OF PROTEIN URINE: CPT

## 2023-02-10 PROCEDURE — 80061 LIPID PANEL: CPT

## 2023-02-10 PROCEDURE — 80197 ASSAY OF TACROLIMUS: CPT

## 2023-02-10 NOTE — TELEPHONE ENCOUNTER
Spoke to pt and asked she repeat the urine sample. Pt reports she had her period when the sample was given. Pt will make sure to wait until it it over.

## 2023-02-17 ENCOUNTER — DOCUMENTATION ONLY (OUTPATIENT)
Dept: TRANSPLANT | Facility: CLINIC | Age: 28
End: 2023-02-17
Payer: COMMERCIAL

## 2023-02-17 ENCOUNTER — TELEPHONE (OUTPATIENT)
Dept: TRANSPLANT | Facility: CLINIC | Age: 28
End: 2023-02-17
Payer: COMMERCIAL

## 2023-02-17 DIAGNOSIS — Z94.4 STATUS POST LIVER TRANSPLANTATION (H): Primary | ICD-10-CM

## 2023-02-17 NOTE — Clinical Note
Please send letter reminding Jaclyn that she should be having lab testing every 3-4 mos and that she is overdue for follow-up w/ Dr. Velásquez.  Needs to ;make next available appt!

## 2023-02-17 NOTE — PROGRESS NOTES
Annual chart review completed.      Patient is up to date with labs but overdue for post transplant follow-up with Dr. Velásquez.  Please call her, ask her to make an appt on his next day of availability.

## 2023-04-01 ENCOUNTER — HEALTH MAINTENANCE LETTER (OUTPATIENT)
Age: 28
End: 2023-04-01

## 2023-05-01 DIAGNOSIS — Z13.220 LIPID SCREENING: ICD-10-CM

## 2023-05-01 DIAGNOSIS — Z94.4 LIVER REPLACED BY TRANSPLANT (H): Primary | ICD-10-CM

## 2023-10-17 ENCOUNTER — TELEPHONE (OUTPATIENT)
Dept: GASTROENTEROLOGY | Facility: CLINIC | Age: 28
End: 2023-10-17
Payer: COMMERCIAL

## 2023-10-17 NOTE — TELEPHONE ENCOUNTER
Spoke to Guillermina and let her know that pt needs to have Bp assessment through her PCP. Guillermina will let pt know.

## 2023-10-17 NOTE — TELEPHONE ENCOUNTER
[facility] Call Center    Phone Message    May a detailed message be left on voicemail: yes     Reason for Call: Other: Sammie from [facility] called requesting a call back in regards to wanting to know if pt's blood pressure is normal to be so high. It was 180 over 132 this morning. Sammie requesting a call back at  in regards to is it ok to proceed w/ pt care?      Action Taken: Other: hepa    Travel Screening: Not Applicable

## 2023-12-08 ENCOUNTER — MYC REFILL (OUTPATIENT)
Dept: TRANSPLANT | Facility: CLINIC | Age: 28
End: 2023-12-08
Payer: COMMERCIAL

## 2023-12-08 DIAGNOSIS — Z94.4 LIVER REPLACED BY TRANSPLANT (H): ICD-10-CM

## 2023-12-08 RX ORDER — TACROLIMUS 1 MG/1
3 CAPSULE ORAL EVERY 12 HOURS
Qty: 540 CAPSULE | Refills: 0 | Status: SHIPPED | OUTPATIENT
Start: 2023-12-08 | End: 2023-12-11

## 2023-12-11 DIAGNOSIS — Z94.4 LIVER REPLACED BY TRANSPLANT (H): ICD-10-CM

## 2023-12-11 RX ORDER — TACROLIMUS 1 MG/1
3 CAPSULE ORAL EVERY 12 HOURS
Qty: 540 CAPSULE | Refills: 3 | Status: SHIPPED | OUTPATIENT
Start: 2023-12-11 | End: 2024-06-24

## 2023-12-11 NOTE — TELEPHONE ENCOUNTER
Is using new pharmacy in [place].  [facility] [id number] [place], [place] PH# [phone], Fax# [phone]   [facility] [place]    Need refill for Tacrolimus 1.0mg sent to [facility]

## 2024-01-24 ENCOUNTER — DOCUMENTATION ONLY (OUTPATIENT)
Dept: TRANSPLANT | Facility: CLINIC | Age: 29
End: 2024-01-24

## 2024-01-24 NOTE — PROGRESS NOTES
Annual chart review completed.      Pt is not up to date with post transplant follow-up.    Please send letter reminding to schedule an appointment to see a liver doctor (hepatologist) here every year and to have lab testing every 3 months.

## 2024-04-22 ENCOUNTER — TELEPHONE (OUTPATIENT)
Dept: TRANSPLANT | Facility: CLINIC | Age: 29
End: 2024-04-22

## 2024-04-22 DIAGNOSIS — Z13.220 LIPID SCREENING: ICD-10-CM

## 2024-04-22 DIAGNOSIS — Z94.4 LIVER REPLACED BY TRANSPLANT (H): Primary | ICD-10-CM

## 2024-04-22 NOTE — LETTER
April 22, 2024    Jaclyn Diaz  [address] [place] [address]        Dear Jaclyn,    We have been trying to reach you for your needed follow up lab work and / or  liver transplant clinic appointment.  The phone number that we have for you is a non-working number.  Please let us know your correct telephone number.     I am concerned about your transplanted liver. For your best outcomes, we recommend:    Lab work every 3-4 months.   We have not received any since February of 2023.   Doctor (transplant hepatologist) follow up every year.   We have not seen you for a visit since June of 2020.    You may NOT have signs or symptoms if you develop rejection or liver dysfunction.  Taking your transplant medications without lab follow up and doctor visits leaves you at risk for serious health complications: rejection, infection, and cancers.    Please have your labs drawn now.    Call us right away at [phone] to get a copy of your lab orders, schedule your appointment, and ask for the liver transplant LPNGriselda       Thank you!    Sarina Escamilla RN    .      Your Liver Transplant Care Team  [facility] Solid Organ Transplant         Sincerely,       Sarina Escamilla RN

## 2024-04-22 NOTE — TELEPHONE ENCOUNTER
Jaclyn hasn't seen Dr. Velásquez since June of 2020 and hasn't had labs since 2/23.  Called her to help her understand why this is important and to ask her to go in for labs in the next 2 weeks.   The phone number that we have for  her is not in service.  Sent her a delinquent lab and appt letter.

## 2024-04-24 ENCOUNTER — TELEPHONE (OUTPATIENT)
Dept: TRANSPLANT | Facility: CLINIC | Age: 29
End: 2024-04-24

## 2024-04-24 DIAGNOSIS — Z94.4 LIVER REPLACED BY TRANSPLANT (H): Primary | ICD-10-CM

## 2024-04-24 NOTE — TELEPHONE ENCOUNTER
"Call from Jaclyn! 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  The phone number and address we had for her was incorrect.   I asked her if she has been taking her tacrolimus. After a bit of a pause she said \"yes\".  She did say that she read about a type of tacro that you only need to take once a day and would like to try it - it would be easier to remember to take if once a day than twice.  I told her about envarsus, also told her that not all insurance co's will pay for it.  I asked her to go in for a 12 hour trough level and all post transplant labs in the next week and when I see those I will be happy to send a prescription for Envarsus and we can see what we can do to get it approved. She says she has good insurance.   She established w/ a local PCP at the [facility] in [place], [place], Dr. Britni Alcantar.  She said that she asked them to send us her labs, (she also had labs in February which were better than the January ones) and will remind them again.  She was seen in January due to very high BP when she went to the dentist.  This is under control now.  She admits that she has gained \"a lot of weight\", is working on weight loss.   We talked about the importance of getting tac level to goal of 3-5 and how a high level can increase BP.   Preferred pharmacy is [facility] in [place], [place].  Placed an order for hepatology follow-up w Dr. Velásquez.   "

## 2024-04-24 NOTE — LETTER
OUTPATIENT LABORATORY TEST ORDER   [facility] laboratory/Dr. Britni Alcantar  Fax#[phone]       Patient Name: Jaclyn Diaz     YOB: 1995       [facility] MR# [if applicable]: [id number]   Date & Time: April 24, 2024  3:36 PM  Expiration Date: 1 year after date issued         Diagnosis:      Liver Transplant (ICD-10 Z94.4)   Aftercare following organ transplant (ICD-10 Z48.288)   Long term use of medications (ICD-10 Z79.899)      We ask your assistance in obtaining the following laboratory tests, which are part of our routine surveillance program for Solid Organ Transplant patients.      Please fax each result to [phone], same day as resulted/available    Critical lab results page [phone]     >1-year post-transplant  Every 2-3 months and as needed  CBC with Platelets   Basic Metabolic Panel   Phosphorous  Magnesium  Hepatic panel   Tacrolimus drug level - 12 hour trough, please document time of last dose         Labs annually due NOW  Fasting Lipid Panel  Urine protein/creatinine ratio  UA with reflex to micro    If you have any questions, please call [facility]- [phone] or [phone], Fax- [phone].    .

## 2024-06-17 ENCOUNTER — TELEPHONE (OUTPATIENT)
Dept: TRANSPLANT | Facility: CLINIC | Age: 29
End: 2024-06-17

## 2024-06-17 NOTE — TELEPHONE ENCOUNTER
[facility] Call Center    Phone Message    May a detailed message be left on voicemail: yes     Reason for Call: Other: patient calling needing her lab orders from  faxed to the [facility]. She said that they have had issues receiving them before so she has 2 fax numbers to fax them to.  [phone] and [phone]    Action Taken: Message routed to:  Other: RNCC    Travel Screening: Not Applicable     Date of Service:

## 2024-06-17 NOTE — LETTER
OUTPATIENT LABORATORY TEST ORDER   [facility] laboratory  Fax#[phone], fax#[phone]        Patient Name: Jaclyn Diaz      YOB: 1995        [facility] MR# [if applicable]: [id number]   Date & Time: April 24, 2024  3:36 PM  Expiration Date: 1 year after date issued         Diagnosis:      Liver Transplant (ICD-10 Z94.4)   Aftercare following organ transplant (ICD-10 Z48.288)   Long term use of medications (ICD-10 Z79.899)      We ask your assistance in obtaining the following laboratory tests, which are part of our routine surveillance program for Solid Organ Transplant patients.      Please fax each result to [phone], same day as resulted/available    Critical lab results page [phone]     >1-year post-transplant  Every 2-3 months and as needed  CBC with Platelets   Basic Metabolic Panel   Phosphorous  Magnesium  Hepatic panel   Tacrolimus drug level - 12 hour trough, please document time of last dose         Labs annually due NOW  Fasting Lipid Panel  Urine protein/creatinine ratio  UA with reflex to micro     If you have any questions, please call [facility]- [phone] or [phone], Fax- [phone].      .

## 2024-06-21 ENCOUNTER — TELEPHONE (OUTPATIENT)
Dept: TRANSPLANT | Facility: CLINIC | Age: 29
End: 2024-06-21

## 2024-06-21 NOTE — LETTER
OUTPATIENT OR TRANSITIONAL CARE  LABORATORY TEST ORDER    Patient Name: Jaclyn Diaz  Transplant Date: 11/8/2008   YOB: 1995  Issue Date: 06/21/24   [facility] MR#:[id number]  Expiration Date: 6/21/25       Diagnoses: [x]      Liver Transplant (ICD-10 Z94.4)    [x]      Long term use of medications (ICD-10 Z79.899)     Please fax results to [phone]    Frequency: one time within next week        [x] Ultrasound abdomen Complete - history of Liver transplant with elevated LFTs.      If you have questions, please call [phone] or [phone].    .

## 2024-06-21 NOTE — TELEPHONE ENCOUNTER
"Spoke with Jaclyn. LFTs increased.    Pt reports that she was started on BP meds in Feb. She is on amlodipine, metropolol and hydrochlorothiazide.     Recommend liver us per dr velásquez. She requestd Fax to marlene [phone] and [phone].    Pt took gilson supplements about a month ago, but was not consistent.encouraged to not take supplment.     Pt has gained weight. Pt's current weight 250. Pt's last weight in 2020 182 pounds. Pt has not been eating the greatest per her report. Offered to have dietician reach out, but she prefers to wait and see what the US shows first.    Pt has intermittent abdominal pain.\"I think I have bile reflux issues.\"     Pt was fasting with her labs. Blood sugar slightly elevated 124.     Pt does intermittently drink alcohol but rare occasion. Encouraged patient to stay away from alcohol.     Pt will complete liver US locally and call with any questions.    Update to dr Velásquez.    "

## 2024-06-24 ENCOUNTER — TELEPHONE (OUTPATIENT)
Dept: TRANSPLANT | Facility: CLINIC | Age: 29
End: 2024-06-24

## 2024-06-24 DIAGNOSIS — Z94.4 STATUS POST LIVER TRANSPLANTATION (H): ICD-10-CM

## 2024-06-24 DIAGNOSIS — Z94.4 LIVER REPLACED BY TRANSPLANT (H): Primary | ICD-10-CM

## 2024-06-24 RX ORDER — TACROLIMUS 1 MG/1
2 CAPSULE ORAL EVERY 12 HOURS
Qty: 360 CAPSULE | Refills: 3 | Status: SHIPPED | OUTPATIENT
Start: 2024-06-24

## 2024-06-24 NOTE — TELEPHONE ENCOUNTER
ISSUE:   Tacrolimus IR level 8.4 on 6/21, goal 3-5, dose 3 mg BID.    PLAN:   Call Patient and confirm this was an accurate 12-hour trough.   Verify Tacrolimus IR dose 3 mg BID.   Confirm no new medications or or missed doses.   Confirm no new illness / infection / diarrhea.   If accurate trough and accurate dose, decrease Tacrolimus IR dose to 2 mg BID     Is this more than a 50% increase or decrease in current IS dose: No  Repeat labs in 2 weeks.    Called patient and left message 6/24/24 10:53 Joanne Gloria RN     OUTCOME:   Spoke with Patient, they confirm accurate trough level and current dose 3 mg BID.   Patient confirmed dose change to 2 mg BID.  Patient agreed to repeat labs in 2 weeks. Also planning to schedule her ultrasound.   Orders sent to preferred pharmacy for dose change and lab for repeat labs.   Patient voiced understanding of plan.      Patient called me back at 11:48

## 2024-07-29 ENCOUNTER — TRANSFERRED RECORDS (OUTPATIENT)
Dept: HEALTH INFORMATION MANAGEMENT | Facility: CLINIC | Age: 29
End: 2024-07-29

## 2024-07-30 ENCOUNTER — TELEPHONE (OUTPATIENT)
Dept: TRANSPLANT | Facility: CLINIC | Age: 29
End: 2024-07-30

## 2024-07-30 NOTE — TELEPHONE ENCOUNTER
Call to Monica to check in .  Liver tests were elevated in June. She had a liver US yesterday in [facility] which showed steatosis.  In June her weight was up to 250.    I called Monica to discuss.  She is working on weight loss - she walks every day.  I suggested weight watchers, portion control and talking w/ her PCP, meeting w/ a dietician.  She understands the affects of obesity on her liver.  I stressed that fatty liver is reversible w/ weight loss.  Her triglycerides are also elevated.   She is taking tac bid as ordered.  She has an appt w/ Dr. Velásquez in mid August.

## 2024-08-19 ENCOUNTER — OFFICE VISIT (OUTPATIENT)
Dept: GASTROENTEROLOGY | Facility: CLINIC | Age: 29
End: 2024-08-19
Attending: INTERNAL MEDICINE
Payer: COMMERCIAL

## 2024-08-19 VITALS
WEIGHT: 258 LBS | SYSTOLIC BLOOD PRESSURE: 146 MMHG | DIASTOLIC BLOOD PRESSURE: 93 MMHG | HEART RATE: 80 BPM | HEIGHT: 68 IN | BODY MASS INDEX: 39.1 KG/M2

## 2024-08-19 DIAGNOSIS — Z94.4 LIVER REPLACED BY TRANSPLANT (H): Primary | ICD-10-CM

## 2024-08-19 PROCEDURE — 99213 OFFICE O/P EST LOW 20 MIN: CPT | Performed by: INTERNAL MEDICINE

## 2024-08-19 PROCEDURE — 99204 OFFICE O/P NEW MOD 45 MIN: CPT | Performed by: INTERNAL MEDICINE

## 2024-08-19 RX ORDER — AMLODIPINE BESYLATE 10 MG/1
10 TABLET ORAL DAILY
COMMUNITY
Start: 2024-01-11 | End: 2025-04-05

## 2024-08-19 RX ORDER — HYDROCHLOROTHIAZIDE 25 MG/1
1 TABLET ORAL DAILY
COMMUNITY
Start: 2024-01-29 | End: 2025-04-23

## 2024-08-19 ASSESSMENT — PAIN SCALES - GENERAL: PAINLEVEL: NO PAIN (0)

## 2024-08-19 NOTE — PROGRESS NOTES
"Solid Organ Transplant Hepatology Follow-Up Visit:     HISTORY OF PRESENT ILLNESS:   I had the pleasure of seeing Jaclyn Diaz for followup in the Liver Clinic at the [facility] on August 19, 2024. Ms. Diaz has now  transitioned from the Pediatric Liver Transplant program to the Adult Liver Transplant Program.  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      She feels well at this visit.  She denies any abdominal pain, itching or skin rash or fatigue. She denies any increased abdominal girth or lower extremity edema.      She denies any fevers or chills, cough or shortness of breath.  She denies any nausea, vomiting, diarrhea or constipation.  Her appetite has been good, and her weight has been increasing.     When her laboratory tests were last checked, her liver enzymes were again elevated and we did get an ultrasound which showed steatosis.  She has begun trying to eat healthier and working on her exercise but is not involved in a formal weight loss program.    Medications:   Current Outpatient Medications   Medication Sig Dispense Refill    amLODIPine (NORVASC) 10 MG tablet Take 10 mg by mouth daily      CITALOPRAM HYDROBROMIDE PO Take 40 mg by mouth daily      hydrochlorothiazide (HYDRODIURIL) 25 MG tablet Take 1 tablet by mouth daily      tacrolimus (GENERIC EQUIVALENT) 1 MG capsule Take 2 capsules (2 mg) by mouth every 12 hours 360 capsule 3     No current facility-administered medications for this visit.      Vitals:   BP (!) 146/93   Pulse 80   Ht 1.727 m (5' 8\")   Wt 117 kg (258 lb)   BMI 39.23 kg/m      Physical Exam:   In general she looks quite well. HEENT exam shows no scleral icterus or temporal muscle wasting. Chest is clear. Abdominal exam shows no increase in girth. No masses or tenderness to palpation are present. Liver is 10 cm in span without left lobe enlargement. No spleen tip is palpable.  Her incision is intact.  " Extremity exam shows no edema. Skin exam shows no suspicious lesions and neurologic exam is nonfocal.     Labs:   Lab Results   Component Value Date     02/10/2023    POTASSIUM 4.2 02/10/2023    CHLORIDE 103 06/20/2024    ANIONGAP 10 02/10/2023    CO2 22 02/10/2023    BUN 10.8 02/10/2023    CR 0.83 02/10/2023    GFRESTIMATED >90 02/10/2023    MARLINE 8.8 02/10/2023      Lab Results   Component Value Date    WBC 7.6 02/10/2023    HGB 15.8 (H) 02/10/2023    HCT 44.2 02/10/2023    MCV 94 02/10/2023    MCH 33.6 (H) 02/10/2023    MCHC 35.7 02/10/2023    RDW 12.6 02/10/2023     02/10/2023     Lab Results   Component Value Date    ALBUMIN 4.2 02/10/2023    ALKPHOS 145 (H) 02/10/2023    AST 45 (H) 02/10/2023    BILICONJ 0.0 01/16/2013     Lab Results   Component Value Date    INR 1.10 09/27/2013       Recent Labs   Lab Test 02/10/23  1122 04/04/22  0958 03/22/21  1105 12/23/19  1119 09/04/19  1129 07/15/19  1141 04/04/18  1136 09/27/17  1310   ALKPHOS 145* 133 113 94 97 94 99 105   ALT 70* 85* 29 29 26 28 25 21   AST 45* 41 14 19 15 16 22 14        Imaging:   US Abdomen Complete  Order: [id number]  Impression    1.  Status post hepatic transplantation. Patent hepatic vasculature.  2.  Hepatic steatosis    Interpreting Provider Location: [facility], [place] [address]  Narrative    US ABDOMEN DUPLEX VESSEL, obtained on 7/29/2024 9:29 AM    REASON FOR EXAM: liver transplant with elevated LFTs, long tern use of medications    COMPARISON: None    TECHNIQUE:  Real time gray scale, color Doppler and spectral Doppler techniques were used.    FINDINGS:    Portal vein: Main, right and left portal vein is patent with appropriate hepatopetal flow. Normal velocity and waveform.    Hepatic veins: Right, middle and left hepatic veins are patent with appropriate periodicity of flow with cardiac cycle.  Hepatic artery: Normal velocity with normal brisk upstroke and diastolic flow.  SMV: Patent  Splenic vein:  Patent    No portosystemic collaterals.    Pancreas: Normal homogenous echogenicity. Tail is poorly visualized due to overlying bowel gas.    Vasculature: Visualized portions of the aorta and inferior vena cava are normal.    Liver: The hepatic transplantation is increased in echogenicity and difficult to penetrate indicative of steatosis. No focal hepatic lesion.    Gallbladder: Absent    Bile Ducts: Common bile duct 5 mm. There is no intrahepatic biliary dilation.    The right kidney measures cm.  There is no parenchymal asymmetry, significant cortical thinning,  hydronephrosis, nephrolithiasis, perinephric fluid collections, or solid mass.    Ascites: None.    Assessment/Plan:   IMPRESSION:   Ms. Diaz is now almost 16 years status post liver transplantation for secondary biliary cirrhosis. She really is doing quite well without much complication from her transplant.  She is up to date with regard to vaccinations.  She is on fairly minimal immunosuppression and while at some increase risk for cancer, it is fairly modest.  I did strongly advise her to stop smoking as I pointed out that smoking-related cancers occur with a greater frequency in transplant patients.  She will get her blood tests done every 3 months.    I did strongly encourage her to get involved with the weight loss program.  She is committed to doing so.  As I mentioned, she has increased her exercising and I think just need some help with caloric targets.  I will see her back in the clinic again in 1 year.    I did spend total of 40 minutes (on the date of the encounter), including 30 minutes of face-to-face clinic time including counseling. The rest of the time was spent in documentation and review of records.     Thank you very much for allowing me to participate in the care of this patient.  If you have any questions regarding my recommendations, please do not hesitate to contact me.         Nikita Velásquez MD      Professor of  Medicine  [employer]      Executive Medical Director, Solid Organ Transplant Program  [facility]

## 2024-08-19 NOTE — LETTER
"8/19/2024      Jaclyn Diaz  [address] [place] [address]      Dear Colleague,    Thank you for referring your patient, Jaclyn Diaz, to the [facility] [place]. Please see a copy of my visit note below.    Solid Organ Transplant Hepatology Follow-Up Visit:     HISTORY OF PRESENT ILLNESS:   I had the pleasure of seeing Jaclyn Diaz for followup in the Liver Clinic at the [facility] on August 19, 2024. Ms. Diaz has now  transitioned from the Pediatric Liver Transplant program to the Adult Liver Transplant Program.  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      She feels well at this visit.  She denies any abdominal pain, itching or skin rash or fatigue. She denies any increased abdominal girth or lower extremity edema.      She denies any fevers or chills, cough or shortness of breath.  She denies any nausea, vomiting, diarrhea or constipation.  Her appetite has been good, and her weight has been increasing.     When her laboratory tests were last checked, her liver enzymes were again elevated and we did get an ultrasound which showed steatosis.  She has begun trying to eat healthier and working on her exercise but is not involved in a formal weight loss program.    Medications:   Current Outpatient Medications   Medication Sig Dispense Refill     amLODIPine (NORVASC) 10 MG tablet Take 10 mg by mouth daily       CITALOPRAM HYDROBROMIDE PO Take 40 mg by mouth daily       hydrochlorothiazide (HYDRODIURIL) 25 MG tablet Take 1 tablet by mouth daily       tacrolimus (GENERIC EQUIVALENT) 1 MG capsule Take 2 capsules (2 mg) by mouth every 12 hours 360 capsule 3     No current facility-administered medications for this visit.      Vitals:   BP (!) 146/93   Pulse 80   Ht 1.727 m (5' 8\")   Wt 117 kg (258 lb)   BMI 39.23 kg/m      Physical Exam:   In general she looks quite well. HEENT exam " shows no scleral icterus or temporal muscle wasting. Chest is clear. Abdominal exam shows no increase in girth. No masses or tenderness to palpation are present. Liver is 10 cm in span without left lobe enlargement. No spleen tip is palpable.  Her incision is intact.  Extremity exam shows no edema. Skin exam shows no suspicious lesions and neurologic exam is nonfocal.     Labs:   Lab Results   Component Value Date     02/10/2023    POTASSIUM 4.2 02/10/2023    CHLORIDE 103 06/20/2024    ANIONGAP 10 02/10/2023    CO2 22 02/10/2023    BUN 10.8 02/10/2023    CR 0.83 02/10/2023    GFRESTIMATED >90 02/10/2023    MARLIEN 8.8 02/10/2023      Lab Results   Component Value Date    WBC 7.6 02/10/2023    HGB 15.8 (H) 02/10/2023    HCT 44.2 02/10/2023    MCV 94 02/10/2023    MCH 33.6 (H) 02/10/2023    MCHC 35.7 02/10/2023    RDW 12.6 02/10/2023     02/10/2023     Lab Results   Component Value Date    ALBUMIN 4.2 02/10/2023    ALKPHOS 145 (H) 02/10/2023    AST 45 (H) 02/10/2023    BILICONJ 0.0 01/16/2013     Lab Results   Component Value Date    INR 1.10 09/27/2013       Recent Labs   Lab Test 02/10/23  1122 04/04/22  0958 03/22/21  1105 12/23/19  1119 09/04/19  1129 07/15/19  1141 04/04/18  1136 09/27/17  1310   ALKPHOS 145* 133 113 94 97 94 99 105   ALT 70* 85* 29 29 26 28 25 21   AST 45* 41 14 19 15 16 22 14        Imaging:   US Abdomen Complete  Order: [id number]  Impression    1.  Status post hepatic transplantation. Patent hepatic vasculature.  2.  Hepatic steatosis    Interpreting Provider Location: [facility], [place] [address]  Narrative    US ABDOMEN DUPLEX VESSEL, obtained on 7/29/2024 9:29 AM    REASON FOR EXAM: liver transplant with elevated LFTs, long tern use of medications    COMPARISON: None    TECHNIQUE:  Real time gray scale, color Doppler and spectral Doppler techniques were used.    FINDINGS:    Portal vein: Main, right and left portal vein is patent with appropriate hepatopetal flow.  Normal velocity and waveform.    Hepatic veins: Right, middle and left hepatic veins are patent with appropriate periodicity of flow with cardiac cycle.  Hepatic artery: Normal velocity with normal brisk upstroke and diastolic flow.  SMV: Patent  Splenic vein: Patent    No portosystemic collaterals.    Pancreas: Normal homogenous echogenicity. Tail is poorly visualized due to overlying bowel gas.    Vasculature: Visualized portions of the aorta and inferior vena cava are normal.    Liver: The hepatic transplantation is increased in echogenicity and difficult to penetrate indicative of steatosis. No focal hepatic lesion.    Gallbladder: Absent    Bile Ducts: Common bile duct 5 mm. There is no intrahepatic biliary dilation.    The right kidney measures cm.  There is no parenchymal asymmetry, significant cortical thinning,  hydronephrosis, nephrolithiasis, perinephric fluid collections, or solid mass.    Ascites: None.    Assessment/Plan:   IMPRESSION:   Ms. Diaz is now almost 16 years status post liver transplantation for secondary biliary cirrhosis. She really is doing quite well without much complication from her transplant.  She is up to date with regard to vaccinations.  She is on fairly minimal immunosuppression and while at some increase risk for cancer, it is fairly modest.  I did strongly advise her to stop smoking as I pointed out that smoking-related cancers occur with a greater frequency in transplant patients.  She will get her blood tests done every 3 months.    I did strongly encourage her to get involved with the weight loss program.  She is committed to doing so.  As I mentioned, she has increased her exercising and I think just need some help with caloric targets.  I will see her back in the clinic again in 1 year.    I did spend total of 40 minutes (on the date of the encounter), including 30 minutes of face-to-face clinic time including counseling. The rest of the time was spent in documentation  and review of records.     Thank you very much for allowing me to participate in the care of this patient.  If you have any questions regarding my recommendations, please do not hesitate to contact me.         Nikita Velásquez MD      Professor of Medicine  [employer]      Executive Medical Director, Solid Organ Transplant Program  [facility]      Again, thank you for allowing me to participate in the care of your patient.        Sincerely,        Nikita Velásquez MD

## 2024-08-19 NOTE — NURSING NOTE
"Chief Complaint   Patient presents with    Follow Up     Post Txp-Liver       BP (!) 146/93   Pulse 80   Ht 1.727 m (5' 8\")   Wt 117 kg (258 lb)   BMI 39.23 kg/m    Kalpana Stanton MA on 8/19/2024 at 2:48 PM    "

## 2025-01-25 ENCOUNTER — HEALTH MAINTENANCE LETTER (OUTPATIENT)
Age: 30
End: 2025-01-25

## 2025-02-05 ENCOUNTER — TELEPHONE (OUTPATIENT)
Dept: TRANSPLANT | Facility: CLINIC | Age: 30
End: 2025-02-05
Payer: COMMERCIAL

## 2025-02-05 NOTE — TELEPHONE ENCOUNTER
Annual chart review.  Hasn't  had labs since June 2024, Had appt w/ Dr. Velásquez in August and has another upcoming appt in August.   Letter to Jaclyn and PCP asking her to have lab tests.

## 2025-02-05 NOTE — LETTER
Jaclyn Diaz  [address] [place] [address]          February 5, 2025    Dear Jaclyn,    Our records show that you are overdue for liver transplant labs.  For your best outcomes, we recommend:    Lab work every 4 months.   We have not received any since August of 2024.   Doctor (transplant hepatologist) follow up every year.   You were seen last August 2024 and have another appointment scheduled in August of 2025. This is perfect.    You may NOT have signs or symptoms if you develop rejection or liver dysfunction.  Taking your transplant medications without lab follow up and doctor visits leaves you at risk for serious health complications: rejection, infection, and cancers.    Please have your labs drawn now.    Call us right away at [phone] to get a copy of your lab orders, schedule your appointment, and talk to your transplant care team.      Thank you!    Sarina Escamilla RN    .      Your Liver Transplant Care Team  [facility] Solid Organ Transplant

## 2025-06-19 ENCOUNTER — TELEPHONE (OUTPATIENT)
Dept: TRANSPLANT | Facility: CLINIC | Age: 30
End: 2025-06-19
Payer: COMMERCIAL

## 2025-06-19 NOTE — LETTER
OUTPATIENT LABORATORY TEST ORDER   Patient copy/reminder     Patient Name: Jaclyn Diaz     YOB: 1995       [facility] MR# [if applicable]: [id number]   Date & Time: April 22, 2024  3:36 PM  Expiration Date: 1 year after date issued         Diagnosis:      Liver Transplant (ICD-10 Z94.4)   Aftercare following organ transplant (ICD-10 Z48.288)   Long term use of medications (ICD-10 Z79.899)      We ask your assistance in obtaining the following laboratory tests, which are part of our routine surveillance program for Solid Organ Transplant patients.      Please fax each result to [phone], same day as resulted/available    Critical lab results page [phone]     >1-year post-transplant  Every 2-3 months  CBC with Platelets   Basic Metabolic Panel   Hepatic panel   Tacrolimus drug level - 12 hour trough, please document time of last dose         Labs annually due June 2025  Fasting Lipid Panel  Urine protein/creatinine ratio  UA with reflex to micro  Magnesium  Phosphorous    If you have any questions, please call [facility]- [phone] or [phone], Fax- [phone].    .

## 2025-06-19 NOTE — TELEPHONE ENCOUNTER
Orders faxed,  left for Jaclyn Diaz.  Rae Salazar, HAILEY      General  Route to LPN    Reason for call: Pt would like her lab orders sent over to her PCP [facility]  [phone]    Call back needed? Yes    Return Call Needed  Same as documented in contacts section  When to return call?: Greater than one day: Route standard priority

## 2025-06-23 ENCOUNTER — RESULTS FOLLOW-UP (OUTPATIENT)
Dept: TRANSPLANT | Facility: CLINIC | Age: 30
End: 2025-06-23
Payer: COMMERCIAL

## 2025-06-23 ENCOUNTER — TELEPHONE (OUTPATIENT)
Dept: TRANSPLANT | Facility: CLINIC | Age: 30
End: 2025-06-23
Payer: COMMERCIAL

## 2025-06-23 ENCOUNTER — RESULTS FOLLOW-UP (OUTPATIENT)
Dept: TRANSPLANT | Facility: CLINIC | Age: 30
End: 2025-06-23

## 2025-06-23 NOTE — TELEPHONE ENCOUNTER
Issue:   Urinalysis: Bacteria 10-50     Plan:   Sarina Escamilla, RN to Griselda Malin LPN    Please call Jaclyn, tell her she may have a UTI - we can send an order for a urine culture but she may want to connect w/ her PCP if she is symptomatic.    Outcome:   Called patient, no answer, left VM

## 2025-06-24 ENCOUNTER — TELEPHONE (OUTPATIENT)
Dept: TRANSPLANT | Facility: CLINIC | Age: 30
End: 2025-06-24
Payer: COMMERCIAL

## 2025-06-24 NOTE — TELEPHONE ENCOUNTER
Mai Johnson provider with  [facility]  endocrinology, stated she's wanting to start patient on zepbound 2.5 mg weekly checking with transplant team to see if this would be ok requesting a call back today if possible

## 2025-06-24 NOTE — TELEPHONE ENCOUNTER
"Per pharmacy, zepbound is fine.  Have not heard bck from Dr. Velásquez but called Dr. Johnson to let them know this.  Also told her that I did not get the \"ok \" from Dr. Velásquez yet.   "

## 2025-06-25 NOTE — PROGRESS NOTES
Pt read Dr. Velásquez's [employer] message. Pt's phone number just rang without the ability to leave a message.